# Patient Record
Sex: MALE | Race: WHITE | NOT HISPANIC OR LATINO | Employment: UNEMPLOYED | URBAN - METROPOLITAN AREA
[De-identification: names, ages, dates, MRNs, and addresses within clinical notes are randomized per-mention and may not be internally consistent; named-entity substitution may affect disease eponyms.]

---

## 2019-07-19 ENCOUNTER — HOSPITAL ENCOUNTER (EMERGENCY)
Facility: HOSPITAL | Age: 54
Discharge: HOME | End: 2019-07-19
Attending: EMERGENCY MEDICINE

## 2019-07-19 ENCOUNTER — APPOINTMENT (EMERGENCY)
Dept: RADIOLOGY | Facility: HOSPITAL | Age: 54
End: 2019-07-19

## 2019-07-19 VITALS
RESPIRATION RATE: 24 BRPM | WEIGHT: 315 LBS | TEMPERATURE: 98 F | HEART RATE: 93 BPM | SYSTOLIC BLOOD PRESSURE: 180 MMHG | DIASTOLIC BLOOD PRESSURE: 90 MMHG | OXYGEN SATURATION: 95 %

## 2019-07-19 DIAGNOSIS — M25.562 PATELLOFEMORAL ARTHRALGIA OF LEFT KNEE: Primary | ICD-10-CM

## 2019-07-19 PROCEDURE — 99283 EMERGENCY DEPT VISIT LOW MDM: CPT

## 2019-07-19 PROCEDURE — 73564 X-RAY EXAM KNEE 4 OR MORE: CPT

## 2019-07-19 RX ORDER — NAPROXEN 250 MG/1
250 TABLET ORAL ONCE
Status: COMPLETED | OUTPATIENT
Start: 2019-07-19 | End: 2019-07-19

## 2019-07-19 RX ORDER — NAPROXEN 250 MG/1
250 TABLET ORAL 2 TIMES DAILY WITH MEALS
Qty: 20 TABLET | Refills: 0 | Status: SHIPPED | OUTPATIENT
Start: 2019-07-19 | End: 2020-08-20 | Stop reason: HOSPADM

## 2019-07-19 RX ORDER — CYCLOBENZAPRINE HCL 10 MG
10 TABLET ORAL 2 TIMES DAILY PRN
Qty: 20 TABLET | Refills: 0 | Status: SHIPPED | OUTPATIENT
Start: 2019-07-19 | End: 2020-08-20 | Stop reason: HOSPADM

## 2019-07-19 RX ORDER — CYCLOBENZAPRINE HCL 10 MG
10 TABLET ORAL ONCE
Status: COMPLETED | OUTPATIENT
Start: 2019-07-19 | End: 2019-07-19

## 2019-07-19 RX ADMIN — CYCLOBENZAPRINE HYDROCHLORIDE 10 MG: 10 TABLET, FILM COATED ORAL at 15:58

## 2019-07-19 RX ADMIN — NAPROXEN 250 MG: 250 TABLET ORAL at 15:58

## 2019-07-19 NOTE — ED PROVIDER NOTES
History  Chief Complaint   Patient presents with    Leg Pain     L knee pain on and off for about a month  today fell when knee went down  now swelling and having shooting pains in it     Patient injured his left knee and twisting motion about a month ago  He did not seek medical attention at that time  Patient states he has been having increasing pain in the anterior aspect of the left knee primarily around the kneecap area  The pain is worse with movement, getting up from the seated position and climbing stairs  Today when the patient got up off the bed he felt sudden severe increase in pain in the left knee and caused him to fall down on a flexed left knee  Patient was able to get up and bear weight with pain, was able to attend to some chores today presents now with pain to the left knee  None       Past Medical History:   Diagnosis Date    Hypertension     Psychiatric disorder        Past Surgical History:   Procedure Laterality Date    VASECTOMY         History reviewed  No pertinent family history  I have reviewed and agree with the history as documented  Social History     Tobacco Use    Smoking status: Current Every Day Smoker     Packs/day: 1 00    Smokeless tobacco: Never Used   Substance Use Topics    Alcohol use: Not Currently    Drug use: Yes     Types: Marijuana        Review of Systems   Constitutional: Negative for chills and fever  HENT: Negative for congestion  Eyes: Negative for visual disturbance  Respiratory: Negative for shortness of breath  Cardiovascular: Positive for leg swelling  Negative for chest pain  Gastrointestinal: Negative for abdominal pain  Genitourinary: Negative for dysuria  Musculoskeletal: Positive for arthralgias, gait problem, joint swelling and neck pain  Skin: Negative for rash  Neurological: Positive for weakness  Negative for syncope and headaches  Hematological: Does not bruise/bleed easily     Psychiatric/Behavioral: Negative for confusion  All other systems reviewed and are negative  Physical Exam  Physical Exam   Constitutional: He is oriented to person, place, and time  Patient is obese   HENT:   Head: Normocephalic and atraumatic  Eyes: Conjunctivae are normal    Neck: Normal range of motion  Neck supple  Cardiovascular: Normal rate, regular rhythm, normal heart sounds and intact distal pulses  Pulmonary/Chest: Effort normal    Abdominal: Soft  Bowel sounds are normal  There is no tenderness  Musculoskeletal: He exhibits tenderness  He exhibits no deformity  Patient is tender directly over the patella  There is no pain or ligamentous laxity with medial lateral stress or drawer sign  Pt is able to lock the knee in extension  Neurological: He is alert and oriented to person, place, and time  Skin: Skin is warm and dry  No rash noted  He is not diaphoretic  No erythema  Nursing note and vitals reviewed  Vital Signs  ED Triage Vitals [07/19/19 1413]   Temperature Pulse Respirations Blood Pressure SpO2   98 °F (36 7 °C) 93 (!) 28 (!) 176/111 95 %      Temp Source Heart Rate Source Patient Position - Orthostatic VS BP Location FiO2 (%)   Tympanic Monitor Sitting Left arm --      Pain Score       5           Vitals:    07/19/19 1413   BP: (!) 176/111   Pulse: 93   Patient Position - Orthostatic VS: Sitting         Visual Acuity      ED Medications  Medications - No data to display    Diagnostic Studies  Results Reviewed     None                 XR knee 4+ vw left injury    (Results Pending)              Procedures  Procedures       ED Course                               MDM  Number of Diagnoses or Management Options  Diagnosis management comments: Will x-ray the knee for possible bony involvement    It I suspect there is some baseline level of DJD and likely patellofemoral syndrome      Disposition  Final diagnoses:   None     ED Disposition     None      Follow-up Information    None Patient's Medications    No medications on file     No discharge procedures on file      ED Provider  Electronically Signed by           Dayna Altamirano MD  07/19/19 9787

## 2020-08-19 ENCOUNTER — HOSPITAL ENCOUNTER (OUTPATIENT)
Facility: HOSPITAL | Age: 55
Setting detail: OBSERVATION
Discharge: HOME | End: 2020-08-20
Attending: EMERGENCY MEDICINE | Admitting: INTERNAL MEDICINE
Payer: COMMERCIAL

## 2020-08-19 ENCOUNTER — APPOINTMENT (EMERGENCY)
Dept: RADIOLOGY | Facility: HOSPITAL | Age: 55
End: 2020-08-19
Payer: COMMERCIAL

## 2020-08-19 DIAGNOSIS — R07.9 CHEST PAIN: Primary | ICD-10-CM

## 2020-08-19 DIAGNOSIS — E66.01 MORBID OBESITY (HCC): ICD-10-CM

## 2020-08-19 DIAGNOSIS — R94.31 ABNORMAL EKG: ICD-10-CM

## 2020-08-19 DIAGNOSIS — I10 HYPERTENSION: ICD-10-CM

## 2020-08-19 PROBLEM — D72.829 LEUKOCYTOSIS: Status: ACTIVE | Noted: 2020-08-19

## 2020-08-19 PROBLEM — R65.10 SIRS (SYSTEMIC INFLAMMATORY RESPONSE SYNDROME) (HCC): Status: ACTIVE | Noted: 2020-08-19

## 2020-08-19 LAB
ALBUMIN SERPL BCP-MCNC: 3.3 G/DL (ref 3.5–5)
ALP SERPL-CCNC: 103 U/L (ref 46–116)
ALT SERPL W P-5'-P-CCNC: 22 U/L (ref 12–78)
ANION GAP SERPL CALCULATED.3IONS-SCNC: 7 MMOL/L (ref 4–13)
APTT PPP: 29 SECONDS (ref 23–37)
AST SERPL W P-5'-P-CCNC: 12 U/L (ref 5–45)
BASOPHILS # BLD AUTO: 0.03 THOUSANDS/ΜL (ref 0–0.1)
BASOPHILS NFR BLD AUTO: 0 % (ref 0–1)
BILIRUB SERPL-MCNC: 0.3 MG/DL (ref 0.2–1)
BUN SERPL-MCNC: 18 MG/DL (ref 5–25)
CALCIUM SERPL-MCNC: 9.2 MG/DL (ref 8.3–10.1)
CHLORIDE SERPL-SCNC: 100 MMOL/L (ref 100–108)
CO2 SERPL-SCNC: 31 MMOL/L (ref 21–32)
CREAT SERPL-MCNC: 0.88 MG/DL (ref 0.6–1.3)
EOSINOPHIL # BLD AUTO: 0.14 THOUSAND/ΜL (ref 0–0.61)
EOSINOPHIL NFR BLD AUTO: 1 % (ref 0–6)
ERYTHROCYTE [DISTWIDTH] IN BLOOD BY AUTOMATED COUNT: 13.6 % (ref 11.6–15.1)
EST. AVERAGE GLUCOSE BLD GHB EST-MCNC: 114 MG/DL
GFR SERPL CREATININE-BSD FRML MDRD: 97 ML/MIN/1.73SQ M
GLUCOSE SERPL-MCNC: 88 MG/DL (ref 65–140)
HBA1C MFR BLD: 5.6 %
HCT VFR BLD AUTO: 42.2 % (ref 36.5–49.3)
HGB BLD-MCNC: 13.7 G/DL (ref 12–17)
IMM GRANULOCYTES # BLD AUTO: 0.04 THOUSAND/UL (ref 0–0.2)
IMM GRANULOCYTES NFR BLD AUTO: 0 % (ref 0–2)
INR PPP: 1.01 (ref 0.84–1.19)
LYMPHOCYTES # BLD AUTO: 2.17 THOUSANDS/ΜL (ref 0.6–4.47)
LYMPHOCYTES NFR BLD AUTO: 18 % (ref 14–44)
MCH RBC QN AUTO: 29.5 PG (ref 26.8–34.3)
MCHC RBC AUTO-ENTMCNC: 32.5 G/DL (ref 31.4–37.4)
MCV RBC AUTO: 91 FL (ref 82–98)
MONOCYTES # BLD AUTO: 1.34 THOUSAND/ΜL (ref 0.17–1.22)
MONOCYTES NFR BLD AUTO: 11 % (ref 4–12)
NEUTROPHILS # BLD AUTO: 8.12 THOUSANDS/ΜL (ref 1.85–7.62)
NEUTS SEG NFR BLD AUTO: 70 % (ref 43–75)
NRBC BLD AUTO-RTO: 0 /100 WBCS
NT-PROBNP SERPL-MCNC: 95 PG/ML
PLATELET # BLD AUTO: 248 THOUSANDS/UL (ref 149–390)
PMV BLD AUTO: 9.3 FL (ref 8.9–12.7)
POTASSIUM SERPL-SCNC: 3.9 MMOL/L (ref 3.5–5.3)
PROT SERPL-MCNC: 8 G/DL (ref 6.4–8.2)
PROTHROMBIN TIME: 13.2 SECONDS (ref 11.6–14.5)
RBC # BLD AUTO: 4.65 MILLION/UL (ref 3.88–5.62)
SARS-COV-2 RNA RESP QL NAA+PROBE: NEGATIVE
SODIUM SERPL-SCNC: 138 MMOL/L (ref 136–145)
TROPONIN I SERPL-MCNC: <0.02 NG/ML
TROPONIN I SERPL-MCNC: <0.02 NG/ML
WBC # BLD AUTO: 11.84 THOUSAND/UL (ref 4.31–10.16)

## 2020-08-19 PROCEDURE — 84484 ASSAY OF TROPONIN QUANT: CPT | Performed by: PHYSICIAN ASSISTANT

## 2020-08-19 PROCEDURE — 80053 COMPREHEN METABOLIC PANEL: CPT | Performed by: EMERGENCY MEDICINE

## 2020-08-19 PROCEDURE — 71045 X-RAY EXAM CHEST 1 VIEW: CPT

## 2020-08-19 PROCEDURE — 99285 EMERGENCY DEPT VISIT HI MDM: CPT

## 2020-08-19 PROCEDURE — 36415 COLL VENOUS BLD VENIPUNCTURE: CPT | Performed by: EMERGENCY MEDICINE

## 2020-08-19 PROCEDURE — 85610 PROTHROMBIN TIME: CPT | Performed by: EMERGENCY MEDICINE

## 2020-08-19 PROCEDURE — 83036 HEMOGLOBIN GLYCOSYLATED A1C: CPT | Performed by: EMERGENCY MEDICINE

## 2020-08-19 PROCEDURE — 94664 DEMO&/EVAL PT USE INHALER: CPT

## 2020-08-19 PROCEDURE — 83880 ASSAY OF NATRIURETIC PEPTIDE: CPT | Performed by: EMERGENCY MEDICINE

## 2020-08-19 PROCEDURE — 84484 ASSAY OF TROPONIN QUANT: CPT | Performed by: EMERGENCY MEDICINE

## 2020-08-19 PROCEDURE — 99219 PR INITIAL OBSERVATION CARE/DAY 50 MINUTES: CPT | Performed by: PHYSICIAN ASSISTANT

## 2020-08-19 PROCEDURE — 93005 ELECTROCARDIOGRAM TRACING: CPT

## 2020-08-19 PROCEDURE — 85025 COMPLETE CBC W/AUTO DIFF WBC: CPT | Performed by: EMERGENCY MEDICINE

## 2020-08-19 PROCEDURE — 85730 THROMBOPLASTIN TIME PARTIAL: CPT | Performed by: EMERGENCY MEDICINE

## 2020-08-19 PROCEDURE — 94760 N-INVAS EAR/PLS OXIMETRY 1: CPT

## 2020-08-19 PROCEDURE — 94640 AIRWAY INHALATION TREATMENT: CPT

## 2020-08-19 PROCEDURE — 87635 SARS-COV-2 COVID-19 AMP PRB: CPT | Performed by: EMERGENCY MEDICINE

## 2020-08-19 PROCEDURE — 99285 EMERGENCY DEPT VISIT HI MDM: CPT | Performed by: EMERGENCY MEDICINE

## 2020-08-19 RX ORDER — IPRATROPIUM BROMIDE AND ALBUTEROL SULFATE 2.5; .5 MG/3ML; MG/3ML
3 SOLUTION RESPIRATORY (INHALATION)
Status: DISCONTINUED | OUTPATIENT
Start: 2020-08-19 | End: 2020-08-20 | Stop reason: HOSPADM

## 2020-08-19 RX ORDER — ASPIRIN 81 MG/1
324 TABLET, CHEWABLE ORAL ONCE
Status: COMPLETED | OUTPATIENT
Start: 2020-08-19 | End: 2020-08-19

## 2020-08-19 RX ORDER — ACETAMINOPHEN 325 MG/1
650 TABLET ORAL ONCE
Status: COMPLETED | OUTPATIENT
Start: 2020-08-19 | End: 2020-08-19

## 2020-08-19 RX ORDER — CALCIUM CARBONATE 200(500)MG
1000 TABLET,CHEWABLE ORAL DAILY PRN
Status: DISCONTINUED | OUTPATIENT
Start: 2020-08-19 | End: 2020-08-20 | Stop reason: HOSPADM

## 2020-08-19 RX ORDER — NITROGLYCERIN 0.4 MG/1
0.4 TABLET SUBLINGUAL
Status: DISCONTINUED | OUTPATIENT
Start: 2020-08-19 | End: 2020-08-20 | Stop reason: HOSPADM

## 2020-08-19 RX ORDER — NITROGLYCERIN 0.4 MG/1
0.4 TABLET SUBLINGUAL ONCE
Status: COMPLETED | OUTPATIENT
Start: 2020-08-19 | End: 2020-08-19

## 2020-08-19 RX ORDER — METOPROLOL TARTRATE 5 MG/5ML
2.5 INJECTION INTRAVENOUS EVERY 6 HOURS PRN
Status: DISCONTINUED | OUTPATIENT
Start: 2020-08-19 | End: 2020-08-20 | Stop reason: HOSPADM

## 2020-08-19 RX ORDER — ACETAMINOPHEN 325 MG/1
650 TABLET ORAL EVERY 4 HOURS PRN
Status: DISCONTINUED | OUTPATIENT
Start: 2020-08-19 | End: 2020-08-20 | Stop reason: HOSPADM

## 2020-08-19 RX ADMIN — IPRATROPIUM BROMIDE AND ALBUTEROL SULFATE 3 ML: 2.5; .5 SOLUTION RESPIRATORY (INHALATION) at 21:27

## 2020-08-19 RX ADMIN — ACETAMINOPHEN 650 MG: 325 TABLET, FILM COATED ORAL at 18:34

## 2020-08-19 RX ADMIN — ASPIRIN 81 MG 324 MG: 81 TABLET ORAL at 17:04

## 2020-08-19 RX ADMIN — NITROGLYCERIN 0.4 MG: 0.4 TABLET SUBLINGUAL at 17:06

## 2020-08-19 RX ADMIN — METOPROLOL TARTRATE 25 MG: 25 TABLET, FILM COATED ORAL at 22:49

## 2020-08-19 NOTE — ED PROVIDER NOTES
History  Chief Complaint   Patient presents with    Chest Pain     c/o pain in chest with inspiration simnce Aprilwith a cough since Feb    Cough     48 yo male c/o chest pain off and on x several months  Pain is a sharp pressure, worse with activity and deep breath   + associated sob, sweats  No nausea, vomiting, diarrhea   + cough x months as well - pt  Is a smoker  Saw a doctor in [place] 4 days ago and put on a water pill for swelling in his legs  No prior diagnoses of HTN, high chol , DM  Chest Pain   Associated symptoms: cough and shortness of breath    Associated symptoms: no abdominal pain, no back pain, no dizziness, no fever, no headache, no nausea and not vomiting    Cough   Associated symptoms: chest pain and shortness of breath    Associated symptoms: no chills, no fever, no headaches, no myalgias, no rash and no sore throat        Prior to Admission Medications   Prescriptions Last Dose Informant Patient Reported? Taking? cyclobenzaprine (FLEXERIL) 10 mg tablet Not Taking at Unknown time  No No   Sig: Take 1 tablet (10 mg total) by mouth 2 (two) times a day as needed for muscle spasms   Patient not taking: Reported on 8/19/2020   naproxen (NAPROSYN) 250 mg tablet Not Taking at Unknown time  No No   Sig: Take 1 tablet (250 mg total) by mouth 2 (two) times a day with meals   Patient not taking: Reported on 8/19/2020      Facility-Administered Medications: None       Past Medical History:   Diagnosis Date    Hypertension     Psychiatric disorder        Past Surgical History:   Procedure Laterality Date    ABDOMINAL SURGERY      SPLENECTOMY, TOTAL      VASECTOMY         No family history on file  I have reviewed and agree with the history as documented      E-Cigarette/Vaping     E-Cigarette/Vaping Substances     Social History     Tobacco Use    Smoking status: Current Every Day Smoker     Packs/day: 1 00    Smokeless tobacco: Never Used   Substance Use Topics    Alcohol use: Not Currently    Drug use: Yes     Types: Marijuana       Review of Systems   Constitutional: Negative  Negative for chills and fever  HENT: Negative  Negative for congestion and sore throat  Eyes: Negative  Respiratory: Positive for cough and shortness of breath  Cardiovascular: Positive for chest pain and leg swelling  Gastrointestinal: Negative  Negative for abdominal pain, diarrhea, nausea and vomiting  Genitourinary: Negative  Negative for dysuria, flank pain and hematuria  Musculoskeletal: Negative  Negative for back pain and myalgias  Skin: Negative  Negative for rash and wound  Neurological: Negative  Negative for dizziness and headaches  Psychiatric/Behavioral: Negative  Negative for confusion and hallucinations  The patient is not nervous/anxious  All other systems reviewed and are negative  Physical Exam  Physical Exam  Vitals signs and nursing note reviewed  Constitutional:       General: He is not in acute distress  Appearance: He is well-developed  He is obese  He is not ill-appearing, toxic-appearing or diaphoretic  HENT:      Head: Normocephalic and atraumatic  Eyes:      General: No scleral icterus  Conjunctiva/sclera: Conjunctivae normal    Neck:      Musculoskeletal: Normal range of motion and neck supple  Cardiovascular:      Rate and Rhythm: Regular rhythm  Tachycardia present  Heart sounds: Murmur present  Pulmonary:      Effort: Pulmonary effort is normal  No respiratory distress  Breath sounds: Normal breath sounds  Comments: Pt  Coughing periodically  Chest:      Chest wall: No tenderness  Abdominal:      General: Bowel sounds are normal  There is no distension  Palpations: Abdomen is soft  Tenderness: There is no abdominal tenderness  Musculoskeletal: Normal range of motion  General: No tenderness or deformity  Right lower leg: Edema present  Left lower leg: Edema present     Skin: General: Skin is warm and dry  Coloration: Skin is not pale  Findings: No erythema or rash  Comments: Chronic stasis changes bilat  Lower ext   Neurological:      Mental Status: He is alert and oriented to person, place, and time  Cranial Nerves: No cranial nerve deficit  Psychiatric:         Mood and Affect: Mood normal          Behavior: Behavior normal          Vital Signs  ED Triage Vitals [08/19/20 1640]   Temperature Pulse Respirations Blood Pressure SpO2   98 2 °F (36 8 °C) 104 20 (!) 187/117 94 %      Temp Source Heart Rate Source Patient Position - Orthostatic VS BP Location FiO2 (%)   Tympanic Monitor Sitting Right arm --      Pain Score       5           Vitals:    08/19/20 1640 08/19/20 1815   BP: (!) 187/117 138/69   Pulse: 104 90   Patient Position - Orthostatic VS: Sitting          Visual Acuity      ED Medications  Medications   nitroglycerin (NITRO-BID) 2 % TD ointment 1 inch (1 inch Topical Not Given 8/19/20 1819)   ipratropium-albuterol (DUO-NEB) 0 5-2 5 mg/3 mL inhalation solution 3 mL (has no administration in time range)   acetaminophen (TYLENOL) tablet 650 mg (has no administration in time range)   nitroglycerin (NITROSTAT) SL tablet 0 4 mg (0 4 mg Sublingual Given 8/19/20 1706)   aspirin chewable tablet 324 mg (324 mg Oral Given 8/19/20 1704)       Diagnostic Studies  Results Reviewed     Procedure Component Value Units Date/Time    Novel Coronavirus [facility] [[id number]]  (Normal) Collected:  08/19/20 1702    Lab Status:  Final result Specimen:  Nares from Nose Updated:  08/19/20 1801     SARS-CoV-2 Negative    Narrative: The specimen collection materials, transport medium, and/or testing methodology utilized in the production of these test results have been proven to be reliable in a limited validation with an abbreviated program under the Emergency Utilization Authorization provided by the FDA    Testing reported as "Presumptive positive" will be confirmed with secondary testing with a reference laboratory to ensure result accuracy  Clinical caution and judgement should be used with the interpretation of these results with consideration of the clinical impression and other laboratory testing  Testing reported as "Positive" or "Negative" has been proven to be accurate according to standard laboratory validation requirements  All testing is performed with control materials showing appropriate reactivity at standard intervals        NT-BNP PRO [[id number]]  (Normal) Collected:  08/19/20 1702    Lab Status:  Final result Specimen:  Blood from Arm, Left Updated:  08/19/20 1732     NT-proBNP 95 pg/mL     Troponin I [[id number]]  (Normal) Collected:  08/19/20 1702    Lab Status:  Final result Specimen:  Blood from Arm, Left Updated:  08/19/20 1730     Troponin I <0 02 ng/mL     Comprehensive metabolic panel [[id number]]  (Abnormal) Collected:  08/19/20 1702    Lab Status:  Final result Specimen:  Blood from Arm, Left Updated:  08/19/20 1726     Sodium 138 mmol/L      Potassium 3 9 mmol/L      Chloride 100 mmol/L      CO2 31 mmol/L      ANION GAP 7 mmol/L      BUN 18 mg/dL      Creatinine 0 88 mg/dL      Glucose 88 mg/dL      Calcium 9 2 mg/dL      AST 12 U/L      ALT 22 U/L      Alkaline Phosphatase 103 U/L      Total Protein 8 0 g/dL      Albumin 3 3 g/dL      Total Bilirubin 0 30 mg/dL      eGFR 97 ml/min/1 73sq m     Narrative:       [place] guidelines for Chronic Kidney Disease (CKD):     Stage 1 with normal or high GFR (GFR > 90 mL/min/1 73 square meters)    Stage 2 Mild CKD (GFR = 60-89 mL/min/1 73 square meters)    Stage 3A Moderate CKD (GFR = 45-59 mL/min/1 73 square meters)    Stage 3B Moderate CKD (GFR = 30-44 mL/min/1 73 square meters)    Stage 4 Severe CKD (GFR = 15-29 mL/min/1 73 square meters)    Stage 5 End Stage CKD (GFR <15 mL/min/1 73 square meters)  Note: GFR calculation is accurate only with a steady state creatinine    Protime-INR [[id number]]  (Normal) Collected:  08/19/20 1702    Lab Status:  Final result Specimen:  Blood from Arm, Left Updated:  08/19/20 1722     Protime 13 2 seconds      INR 1 01    APTT [[id number]]  (Normal) Collected:  08/19/20 1702    Lab Status:  Final result Specimen:  Blood from Arm, Left Updated:  08/19/20 1722     PTT 29 seconds     CBC and differential [[id number]]  (Abnormal) Collected:  08/19/20 1702    Lab Status:  Final result Specimen:  Blood from Arm, Left Updated:  08/19/20 1709     WBC 11 84 Thousand/uL      RBC 4 65 Million/uL      Hemoglobin 13 7 g/dL      Hematocrit 42 2 %      MCV 91 fL      MCH 29 5 pg      MCHC 32 5 g/dL      RDW 13 6 %      MPV 9 3 fL      Platelets 363 Thousands/uL      nRBC 0 /100 WBCs      Neutrophils Relative 70 %      Immat GRANS % 0 %      Lymphocytes Relative 18 %      Monocytes Relative 11 %      Eosinophils Relative 1 %      Basophils Relative 0 %      Neutrophils Absolute 8 12 Thousands/µL      Immature Grans Absolute 0 04 Thousand/uL      Lymphocytes Absolute 2 17 Thousands/µL      Monocytes Absolute 1 34 Thousand/µL      Eosinophils Absolute 0 14 Thousand/µL      Basophils Absolute 0 03 Thousands/µL     Hemoglobin A1C [[id number]] Collected:  08/19/20 1702    Lab Status:   In process Specimen:  Blood from Arm, Left Updated:  08/19/20 1707    UA (URINE) with reflex to Scope [[id number]]     Lab Status:  No result Specimen:  Urine                  XR chest 1 view portable   ED Interpretation by Chris Sanders MD (12/29 [id number])   CM                 Procedures  ECG 12 Lead Documentation Only    Date/Time: 8/19/2020 4:41 PM  Performed by: Chris Sanders MD  Authorized by: Chris Sanders MD     Indications / Diagnosis:  Chest pain  ECG reviewed by me, the ED Provider: yes    Patient location:  ED  Previous ECG:     Previous ECG:  Unavailable  Interpretation:     Interpretation: abnormal    Rate:     ECG rate:  101    ECG rate assessment: tachycardic Rhythm:     Rhythm: sinus tachycardia    Ectopy:     Ectopy: PAC    QRS:     QRS axis:  Normal  Conduction:     Conduction: normal    ST segments:     ST segments:  Normal  T waves:     T waves: inverted      Inverted:  I, aVL, V5 and V6             ED Course       US AUDIT      Most Recent Value   Initial Alcohol Screen: US AUDIT-C    1  How often do you have a drink containing alcohol?  0 Filed at: 08/19/2020 1641   3a  Male UNDER 65: How often do you have five or more drinks on one occasion? 0 Filed at: 08/19/2020 1641   Audit-C Score  0 Filed at: 08/19/2020 1641            HEART Risk Score      Most Recent Value   Heart Score Risk Calculator   History  2 Filed at: 08/19/2020 1813   ECG  1 Filed at: 08/19/2020 1813   Age  1 Filed at: 08/19/2020 1813   Risk Factors  1 Filed at: 08/19/2020 1813   Troponin  0 Filed at: 08/19/2020 1813   HEART Score  5 Filed at: 08/19/2020 1813            PRASHANT/DAST-10      Most Recent Value   How many times in the past year have you    Used an illegal drug or used a prescription medication for non-medical reasons? Never Filed at: 08/19/2020 1641                                MDM  Number of Diagnoses or Management Options  Abnormal EKG:   Chest pain:   Hypertension:   Morbid obesity [facility]):   Diagnosis management comments: 1805 - /73, HR 91, pulse ox 94% on RA  Pt  Denies chest pain at this time  covid negative  Will try neb for the cough/sob  Will admit for r/o ACS  Pt with concerning symptoms and abnormal ekg and multiple risk factors, heart score 5    Discussed with hospitalist         Disposition  Final diagnoses:   Chest pain   Morbid obesity ([address] )   Hypertension   Abnormal EKG     Time reflects when diagnosis was documented in both MDM as applicable and the Disposition within this note     Time User Action Codes Description Comment    3/27/4831  2:32 PM Obdulia Jangs A Add [U05 4] Chest pain     4/69/7822  5:48 PM Jackqulyn Forts A Add [W67 50] Morbid obesity ([facility] [address] )     6/01/0555  9:69 PM Lora BURGER Add [L44] Hypertension     8/11/6255  7:53 PM Lora BURGER Add [A96 25] Abnormal EKG       ED Disposition     ED Disposition Condition Date/Time Comment    Admit Stable Wed Aug 19, 2020  6:30 PM Case was discussed with **Dr Sriram Covarrubias* and the patient's admission status was agreed to be Admission Status: observation status      Follow-up Information    None         Patient's Medications   Discharge Prescriptions    No medications on file     No discharge procedures on file      PDMP Review     None          ED Provider  Electronically Signed by           Rand Pereyra MD  30/77/56 6759

## 2020-08-20 ENCOUNTER — APPOINTMENT (OUTPATIENT)
Dept: RADIOLOGY | Facility: HOSPITAL | Age: 55
End: 2020-08-20
Payer: COMMERCIAL

## 2020-08-20 ENCOUNTER — APPOINTMENT (OUTPATIENT)
Dept: NON INVASIVE DIAGNOSTICS | Facility: HOSPITAL | Age: 55
End: 2020-08-20
Payer: COMMERCIAL

## 2020-08-20 VITALS
HEART RATE: 86 BPM | WEIGHT: 315 LBS | RESPIRATION RATE: 19 BRPM | DIASTOLIC BLOOD PRESSURE: 88 MMHG | TEMPERATURE: 98.2 F | HEIGHT: 72 IN | OXYGEN SATURATION: 94 % | BODY MASS INDEX: 42.66 KG/M2 | SYSTOLIC BLOOD PRESSURE: 146 MMHG

## 2020-08-20 LAB
ANION GAP SERPL CALCULATED.3IONS-SCNC: 5 MMOL/L (ref 4–13)
BACTERIA UR QL AUTO: ABNORMAL /HPF
BASOPHILS # BLD AUTO: 0.03 THOUSANDS/ΜL (ref 0–0.1)
BASOPHILS NFR BLD AUTO: 0 % (ref 0–1)
BILIRUB UR QL STRIP: NEGATIVE
BUN SERPL-MCNC: 15 MG/DL (ref 5–25)
CALCIUM SERPL-MCNC: 8.5 MG/DL (ref 8.3–10.1)
CHEST PAIN STATEMENT: NORMAL
CHLORIDE SERPL-SCNC: 100 MMOL/L (ref 100–108)
CHOLEST SERPL-MCNC: 135 MG/DL (ref 50–200)
CLARITY UR: CLEAR
CO2 SERPL-SCNC: 31 MMOL/L (ref 21–32)
COLOR UR: YELLOW
CREAT SERPL-MCNC: 0.91 MG/DL (ref 0.6–1.3)
EOSINOPHIL # BLD AUTO: 0.19 THOUSAND/ΜL (ref 0–0.61)
EOSINOPHIL NFR BLD AUTO: 2 % (ref 0–6)
ERYTHROCYTE [DISTWIDTH] IN BLOOD BY AUTOMATED COUNT: 13.8 % (ref 11.6–15.1)
GFR SERPL CREATININE-BSD FRML MDRD: 95 ML/MIN/1.73SQ M
GLUCOSE P FAST SERPL-MCNC: 104 MG/DL (ref 65–99)
GLUCOSE SERPL-MCNC: 104 MG/DL (ref 65–140)
GLUCOSE UR STRIP-MCNC: NEGATIVE MG/DL
HCT VFR BLD AUTO: 45.5 % (ref 36.5–49.3)
HDLC SERPL-MCNC: 36 MG/DL
HGB BLD-MCNC: 14.5 G/DL (ref 12–17)
HGB UR QL STRIP.AUTO: NEGATIVE
IMM GRANULOCYTES # BLD AUTO: 0.06 THOUSAND/UL (ref 0–0.2)
IMM GRANULOCYTES NFR BLD AUTO: 1 % (ref 0–2)
KETONES UR STRIP-MCNC: NEGATIVE MG/DL
LDLC SERPL CALC-MCNC: 80 MG/DL (ref 0–100)
LEUKOCYTE ESTERASE UR QL STRIP: NEGATIVE
LYMPHOCYTES # BLD AUTO: 2.45 THOUSANDS/ΜL (ref 0.6–4.47)
LYMPHOCYTES NFR BLD AUTO: 27 % (ref 14–44)
MAX DIASTOLIC BP: 83 MMHG
MAX HEART RATE: 110 BPM
MAX PREDICTED HEART RATE: 166 BPM
MAX. SYSTOLIC BP: 168 MMHG
MCH RBC QN AUTO: 29 PG (ref 26.8–34.3)
MCHC RBC AUTO-ENTMCNC: 31.9 G/DL (ref 31.4–37.4)
MCV RBC AUTO: 91 FL (ref 82–98)
MONOCYTES # BLD AUTO: 0.86 THOUSAND/ΜL (ref 0.17–1.22)
MONOCYTES NFR BLD AUTO: 9 % (ref 4–12)
NEUTROPHILS # BLD AUTO: 5.65 THOUSANDS/ΜL (ref 1.85–7.62)
NEUTS SEG NFR BLD AUTO: 61 % (ref 43–75)
NITRITE UR QL STRIP: NEGATIVE
NON-SQ EPI CELLS URNS QL MICRO: ABNORMAL /HPF
NRBC BLD AUTO-RTO: 0 /100 WBCS
PH UR STRIP.AUTO: 5.5 [PH]
PLATELET # BLD AUTO: 250 THOUSANDS/UL (ref 149–390)
PMV BLD AUTO: 9.7 FL (ref 8.9–12.7)
POTASSIUM SERPL-SCNC: 3.9 MMOL/L (ref 3.5–5.3)
PROCALCITONIN SERPL-MCNC: <0.05 NG/ML
PROT UR STRIP-MCNC: ABNORMAL MG/DL
PROTOCOL NAME: NORMAL
RBC # BLD AUTO: 5 MILLION/UL (ref 3.88–5.62)
RBC #/AREA URNS AUTO: ABNORMAL /HPF
REASON FOR TERMINATION: NORMAL
SODIUM SERPL-SCNC: 136 MMOL/L (ref 136–145)
SP GR UR STRIP.AUTO: 1.02 (ref 1–1.03)
TARGET HR FORMULA: NORMAL
TEST INDICATION: NORMAL
TIME IN EXERCISE PHASE: NORMAL
TRIGL SERPL-MCNC: 93 MG/DL
TROPONIN I SERPL-MCNC: <0.02 NG/ML
UROBILINOGEN UR QL STRIP.AUTO: 0.2 E.U./DL
WBC # BLD AUTO: 9.24 THOUSAND/UL (ref 4.31–10.16)
WBC #/AREA URNS AUTO: ABNORMAL /HPF

## 2020-08-20 PROCEDURE — 81001 URINALYSIS AUTO W/SCOPE: CPT | Performed by: PHYSICIAN ASSISTANT

## 2020-08-20 PROCEDURE — 93018 CV STRESS TEST I&R ONLY: CPT | Performed by: INTERNAL MEDICINE

## 2020-08-20 PROCEDURE — 84484 ASSAY OF TROPONIN QUANT: CPT | Performed by: PHYSICIAN ASSISTANT

## 2020-08-20 PROCEDURE — 84145 PROCALCITONIN (PCT): CPT | Performed by: PHYSICIAN ASSISTANT

## 2020-08-20 PROCEDURE — 93017 CV STRESS TEST TRACING ONLY: CPT

## 2020-08-20 PROCEDURE — 97163 PT EVAL HIGH COMPLEX 45 MIN: CPT

## 2020-08-20 PROCEDURE — A9502 TC99M TETROFOSMIN: HCPCS

## 2020-08-20 PROCEDURE — 78452 HT MUSCLE IMAGE SPECT MULT: CPT

## 2020-08-20 PROCEDURE — 80061 LIPID PANEL: CPT | Performed by: PHYSICIAN ASSISTANT

## 2020-08-20 PROCEDURE — 97116 GAIT TRAINING THERAPY: CPT

## 2020-08-20 PROCEDURE — G1004 CDSM NDSC: HCPCS

## 2020-08-20 PROCEDURE — 93306 TTE W/DOPPLER COMPLETE: CPT

## 2020-08-20 PROCEDURE — 93306 TTE W/DOPPLER COMPLETE: CPT | Performed by: INTERNAL MEDICINE

## 2020-08-20 PROCEDURE — 93016 CV STRESS TEST SUPVJ ONLY: CPT | Performed by: INTERNAL MEDICINE

## 2020-08-20 PROCEDURE — 93005 ELECTROCARDIOGRAM TRACING: CPT

## 2020-08-20 PROCEDURE — 85025 COMPLETE CBC W/AUTO DIFF WBC: CPT | Performed by: PHYSICIAN ASSISTANT

## 2020-08-20 PROCEDURE — 80048 BASIC METABOLIC PNL TOTAL CA: CPT | Performed by: PHYSICIAN ASSISTANT

## 2020-08-20 PROCEDURE — 78452 HT MUSCLE IMAGE SPECT MULT: CPT | Performed by: INTERNAL MEDICINE

## 2020-08-20 PROCEDURE — 99217 PR OBSERVATION CARE DISCHARGE MANAGEMENT: CPT | Performed by: INTERNAL MEDICINE

## 2020-08-20 PROCEDURE — 94640 AIRWAY INHALATION TREATMENT: CPT

## 2020-08-20 PROCEDURE — 99244 OFF/OP CNSLTJ NEW/EST MOD 40: CPT | Performed by: INTERNAL MEDICINE

## 2020-08-20 RX ORDER — LISINOPRIL AND HYDROCHLOROTHIAZIDE 25; 20 MG/1; MG/1
1 TABLET ORAL DAILY
Qty: 30 TABLET | Refills: 0 | Status: SHIPPED | OUTPATIENT
Start: 2020-08-20 | End: 2020-12-07 | Stop reason: SDUPTHER

## 2020-08-20 RX ADMIN — ENOXAPARIN SODIUM 40 MG: 40 INJECTION SUBCUTANEOUS at 09:34

## 2020-08-20 RX ADMIN — IPRATROPIUM BROMIDE AND ALBUTEROL SULFATE 3 ML: 2.5; .5 SOLUTION RESPIRATORY (INHALATION) at 07:22

## 2020-08-20 RX ADMIN — PERFLUTREN 2 ML/MIN: 6.52 INJECTION, SUSPENSION INTRAVENOUS at 08:40

## 2020-08-20 RX ADMIN — IPRATROPIUM BROMIDE AND ALBUTEROL SULFATE 3 ML: 2.5; .5 SOLUTION RESPIRATORY (INHALATION) at 01:25

## 2020-08-20 RX ADMIN — REGADENOSON 0.4 MG: 0.08 INJECTION, SOLUTION INTRAVENOUS at 11:23

## 2020-08-20 RX ADMIN — IPRATROPIUM BROMIDE AND ALBUTEROL SULFATE 3 ML: 2.5; .5 SOLUTION RESPIRATORY (INHALATION) at 13:38

## 2020-08-20 NOTE — DISCHARGE SUMMARY
Discharge- Quincy Jiménez 1965, 47 y o  male MRN: [id number]    Unit/Bed#: [address] [place] [address] Encounter: [id number]    Primary Care Provider: No primary care provider on file  Date and time admitted to hospital: 8/19/2020  4:33 PM        * Chest pain  Assessment & Plan  Pt presented to the ED today for evaluation of severe intermittent chest pain associated with SOB, diaphoresis, cough which has been intermittent for the past 3 weeks  He states he has had JACINTO since the beginning of the year  His chest pain is described as a sharp/stabbing sensation, and is sometimes worse with movement, other times the pain comes on unprovoked by movement or activity  He also describes worsening LE edema in the past year  He has not been to PCP in many years  Recently was seen at a clinic in [place] and prescribed a medication for his lower extremity edema but is not longer taking this  - EKG showed sinus tachycardia with T wave inversion in I, aVL, V5, V6 with no prior EKG available for comparison  Serial troponins were negative  - CXR with mild cardiomegaly  - Patient does not want any pain medications other than Tylenol as he states he has a history of ETOH/drug abuse   Telemetry did not show any evidence of arrhythmias  - BP improved with SL nitro  Patient received nitro and was started on Lopressor with improved blood pressure  Patient to be discharged on lisinopril/hydrochlorothiazide 20/25 milligram p o  Daily    SIRS (systemic inflammatory response syndrome) (HCC)  Assessment & Plan  WBC 11 84 and tachycardia on admission  CXR with mild cardiomegaly  COVID negative  Pt has chronic cough with minimal sputum production  UA was unremarkable  Count has normalized and patient did not have any fever  Hypertension  Assessment & Plan  BP elevated in ED, noted to be 187/117  BP improved after nitro  Patient was initially started on Lopressor 25 milligram p o  B i d   With improved blood pressure  Patient to be discharged on lisinopril/hydrochlorothiazide 20/25 milligram p o  Daily      Discharging Physician / Practitioner: Martín Kellogg MD  PCP: No primary care provider on file  Admission Date:   Admission Orders (From admission, onward)     Ordered        08/19/20 1831  Place in Observation (expected length of stay for this patient is less than two midnights)  Once                   Discharge Date: 08/20/20    Resolved Problems  Date Reviewed: 8/20/2020    None          Consultations During Hospital Stay:  · Cardiology    Procedures Performed:   · Nuclear stress test was normal  · Echo showed EF of 50 for 60% with grade 1 diastolic dysfunction with moderate aortic stenosis       Outpatient Tests Requested:  · Follow-up with PCP and Cardiology    Complications:  None    Reason for Admission:  Chest pain    Hospital Course:     Myles Kent is a 47 y o  male patient with past medical history of recently diagnosed hypertension, depression who originally presented to the hospital on 8/19/2020 due to chest pain  Patient has been complaining of chest pain intermittently for the past 2 weeks sometimes on the left side and sometimes on the rest sometimes in epigastric region  Patient present to the ED and was admitted hospital to rule out ACS  Patient had serial cardiac enzymes which were negative  Patient had significantly elevated blood pressure and initially was started on Lopressor which was later changed to lisinopril/hydrochlorothiazide  Patient was also seen by cardiology and patient underwent nuclear stress test and echo which were normal other than diastolic dysfunction and moderate aortic stenosis  Patient remained stable and will be discharged home to follow up outpatient with PCP and Cardiology  Please see above list of diagnoses and related plan for additional information       Condition at Discharge: stable     Discharge Day Visit / Exam:     Subjective:  Patient had no further chest pain since admission or shortness of breath  Vitals: Blood Pressure: 146/88 (08/20/20 0932)  Pulse: 86 (08/20/20 0925)  Temperature: 98 2 °F (36 8 °C) (08/20/20 0925)  Temp Source: Oral (08/20/20 0925)  Respirations: 19 (08/20/20 0925)  Height: 6' (182 9 cm) (08/19/20 2015)  Weight - Scale: (!) 195 kg (429 lb) (08/19/20 1640)  SpO2: 94 % (08/20/20 0925)  Exam:   Physical Exam  Constitutional:       Appearance: Normal appearance  HENT:      Head: Normocephalic and atraumatic  Eyes:      Extraocular Movements: Extraocular movements intact  Pupils: Pupils are equal, round, and reactive to light  Neck:      Musculoskeletal: Normal range of motion and neck supple  Cardiovascular:      Rate and Rhythm: Normal rate and regular rhythm  Heart sounds: Murmur present  No gallop  Comments: Ejection systolic murmur  Pulmonary:      Effort: Pulmonary effort is normal       Breath sounds: Normal breath sounds  Abdominal:      General: Bowel sounds are normal       Palpations: Abdomen is soft  Tenderness: There is no abdominal tenderness  Musculoskeletal: Normal range of motion  General: No swelling or deformity  Skin:     General: Skin is warm and dry  Neurological:      General: No focal deficit present  Mental Status: He is alert  Discharge instructions/Information to patient and family:   See after visit summary for information provided to patient and family  Provisions for Follow-Up Care:  See after visit summary for information related to follow-up care and any pertinent home health orders  Disposition:     Home      Planned Readmission: No     Discharge Statement:  I spent 25 minutes discharging the patient  This time was spent on the day of discharge  I had direct contact with the patient on the day of discharge   Greater than 50% of the total time was spent examining patient, answering all patient questions, arranging and discussing plan of care with patient as well as directly providing post-discharge instructions  Additional time then spent on discharge activities  Discharge Medications:  See after visit summary for reconciled discharge medications provided to patient and family        ** Please Note: This note has been constructed using a voice recognition system **

## 2020-08-20 NOTE — PLAN OF CARE
Problem: Potential for Falls  Goal: Patient will remain free of falls  Description: INTERVENTIONS:  - Assess patient frequently for physical needs  -  Identify cognitive and physical deficits and behaviors that affect risk of falls    -  [place] fall precautions as indicated by assessment   - Educate patient/family on patient safety including physical limitations  - Instruct patient to call for assistance with activity based on assessment  - Modify environment to reduce risk of injury  - Consider OT/PT consult to assist with strengthening/mobility  Outcome: Progressing     Problem: RESPIRATORY - ADULT  Goal: Achieves optimal ventilation and oxygenation  Description: INTERVENTIONS:  - Assess for changes in respiratory status  - Assess for changes in mentation and behavior  - Position to facilitate oxygenation and minimize respiratory effort  - Oxygen administered by appropriate delivery if ordered  - Initiate smoking cessation education as indicated  - Encourage broncho-pulmonary hygiene including cough, deep breathe, Incentive Spirometry  - Assess the need for suctioning and aspirate as needed  - Assess and instruct to report SOB or any respiratory difficulty  - Respiratory Therapy support as indicated  Outcome: Progressing

## 2020-08-20 NOTE — NURSING NOTE
Patient verbalized understanding of discharge instructions  Patient's IV removed  Patient discharged in stable condition

## 2020-08-20 NOTE — ASSESSMENT & PLAN NOTE
Pt presented to the ED today for evaluation of severe intermittent chest pain associated with SOB, diaphoresis, cough which has been intermittent for the past 3 weeks  He states he has had JACINTO since the beginning of the year  His chest pain is described as a sharp/stabbing sensation, and is sometimes worse with movement, other times the pain comes on unprovoked by movement or activity  He also describes worsening LE edema in the past year  He has not been to PCP in many years  Recently was seen at a clinic in [facility] and prescribed a medication for his lower extremity edema but is not longer taking this    - EKG showed sinus tachycardia with T wave inversion in I, aVL, V5, V6 with no prior EKG available for comparison  - Initial troponin < 0 02, continue to trend  - CXR with mild cardiomegaly  - Pt received , SL nitro in ED with some improvement in pain  - Patient does not want any pain medications other than Tylenol as he states he has a history of ETOH/drug abuse   - Monitor on telemetry  - BP improved with SL nitro  - Add Lopressor BID, prn nitro  - Appreciate cardiology recommendations

## 2020-08-20 NOTE — ASSESSMENT & PLAN NOTE
WBC 11 84 and tachycardia on admission  CXR with mild cardiomegaly  COVID negative  Pt has chronic cough with minimal sputum production  UA was unremarkable  Count has normalized and patient did not have any fever

## 2020-08-20 NOTE — ASSESSMENT & PLAN NOTE
BP elevated in ED, noted to be 187/117  BP improved after nitro  Continue to monitor  BB added in setting of suspected ACS, continue Lopressor Q12

## 2020-08-20 NOTE — ASSESSMENT & PLAN NOTE
BP elevated in ED, noted to be 187/117  BP improved after nitro  Patient was initially started on Lopressor 25 milligram p o  B i d  With improved blood pressure  Patient to be discharged on lisinopril/hydrochlorothiazide 20/25 milligram p o   Daily

## 2020-08-20 NOTE — PHYSICAL THERAPY NOTE
PT EVALUATION    Pt is independent with ADLs and not in need of skilled OT services at this time  Pt will cont with PT      08/20/20 0910   Note Type   Note type Eval/Treat   Pain Assessment   Pain Assessment Tool 0-10   Pain Score 6   Pain Location/Orientation Location: Knee;Orientation: Left   Home Living   Type of Home House  ([address])   Home Layout Two level  (bathroom upstairs)   Home Equipment   (no DME per pt)   Prior Function   Level of [place] Independent with ADLs and functional mobility   Lives With Friend(s)   Receives Help From Friend(s)   ADL Assistance Independent   IADLs Independent   Comments Pt amb w/out AD PTA   Restrictions/Precautions   Other Precautions Pain; Fall Risk  (SOB)   General   Additional Pertinent History Pt adm with chest pain  Family/Caregiver Present No   Cognition   Overall Cognitive Status WFL   Arousal/Participation Cooperative   Orientation Level Oriented X4   Following Commands Follows all commands and directions without difficulty   RLE Assessment   RLE Assessment WFL  (4-/5)   LLE Assessment   LLE Assessment WFL  (3+ to 4-/5)   Transfers   Sit to Stand 7  Independent   Stand to Sit 7  Independent   Ambulation/Elevation   Gait pattern Antalgic   Gait Assistance 5  Supervision   Additional items Verbal cues; Tactile cues   Assistive Device None   Distance 20 feet with change in direction;pt moderately SOB with ambulation   Balance   Static Sitting Good   Static Standing Good   Dynamic Standing Fair   Ambulatory Fair -   Activity Tolerance   Activity Tolerance Patient limited by fatigue;Patient limited by pain  (SOB)   Assessment   Problem List Decreased range of motion;Decreased strength;Decreased endurance; Impaired balance;Decreased mobility; Decreased coordination;Decreased safety awareness; Obesity;Pain   Assessment Patient seen for Physical Therapy evaluation  Patient admitted with Chest pain    Comorbidities affecting patient's physical performance include: HTN, SIRS, history of left knee pain  Personal factors affecting patient at time of initial evaluation include: lives in two story house, stairs to enter home, inability to navigate community distances, inability to navigate level surfaces without external assistance, inability to perform dynamic tasks in community and limited home support  Prior to admission, patient was independent with functional mobility without assistive device, independent with ADLS, independent with IADLS, living with friends in a one level home with 5 steps to enter, ambulating household distance and ambulating community distances  Please find objective findings from Physical Therapy assessment regarding body systems outlined above with impairments and limitations including weakness, decreased ROM, impaired balance, decreased endurance, impaired coordination, gait deviations, pain, decreased activity tolerance, decreased functional mobility tolerance, decreased safety awareness and fall risk  The Barthel Index was used as a functional outcome tool presenting with a score of 85 today indicating minimal limitations of functional mobility and ADLS  Patient's clinical presentation is currently unstable/unpredictable as seen in patient's presentation of vital sign response, changing level of pain, increased fall risk, new onset of impairment of functional mobility, decreased endurance and new onset of weakness  Pt would benefit from continued Physical Therapy treatment to address deficits as defined above and maximize level of functional mobility  As demonstrated by objective findings, the assigned level of complexity for this evaluation is high     Goals   Patient Goals "less pain in my knee"   STG Expiration Date 08/27/20   Short Term Goal #1 With less pain, pt will amb with LRD functional household distances - S/I; balance with LRD - F/F+ for safe mobility and to decrease fall risk   Short Term Goal #2 With less pain, pt will amb up/down full flight of steps - S/I so pt can access all areas of his home   LTG Expiration Date 09/03/20   Long Term Goal #1 Pt will return to independent functional mobility with/wout AD, home and community distances, levels/unlevels   Long Term Goal #2 strength LEs - 4- to 4/5; balance - G for safe mobility and to decrease fall risk   Plan   Treatment/Interventions ADL retraining;Functional transfer training;LE strengthening/ROM; Elevations; Therapeutic exercise; Endurance training;Patient/family training;Equipment eval/education; Bed mobility;Gait training; Compensatory technique education   PT Frequency 5x/wk   Recommendation   PT Discharge Recommendation Other (Comment)  (OP PT)   Equipment Recommended   (possible cane/RW)   Barthel Index   Feeding 10   Bathing 5   Grooming Score 5   Dressing Score 10   Bladder Score 10   Bowels Score 10   Toilet Use Score 10   Transfers (Bed/Chair) Score 10   Mobility (Level Surface) Score 10   Stairs Score 5   Barthel Index Score 80   Licensure   [place] License Number  Sirena Aquino [place], [place] [id number]     Time DO:0505  Time Out:0950  Total Time: 10 mins      S:  Pt reports left knee pain  O:  Pt trans sit to stand - I  Pt amb with cane x 25 feet with S;rest;pt then amb with RW x 25 feet  Pt trans stand to sit - I   A: Gait most improved with RW vs cane of no AD  Pt issued a cane for home use  Pt does not want a RW for home use  Pt will benefit from cont skilled PT services to increase pt's strength and mobility  P:  Cont per PT POC - DCP - OP PT      [address] [place], [place]   [id number]

## 2020-08-20 NOTE — ASSESSMENT & PLAN NOTE
Pt presented to the ED today for evaluation of severe intermittent chest pain associated with SOB, diaphoresis, cough which has been intermittent for the past 3 weeks  He states he has had JACINTO since the beginning of the year  His chest pain is described as a sharp/stabbing sensation, and is sometimes worse with movement, other times the pain comes on unprovoked by movement or activity  He also describes worsening LE edema in the past year  He has not been to PCP in many years  Recently was seen at a clinic in [place] and prescribed a medication for his lower extremity edema but is not longer taking this  - EKG showed sinus tachycardia with T wave inversion in I, aVL, V5, V6 with no prior EKG available for comparison  Serial troponins were negative  - CXR with mild cardiomegaly  - Patient does not want any pain medications other than Tylenol as he states he has a history of ETOH/drug abuse   Telemetry did not show any evidence of arrhythmias  - BP improved with SL nitro  Patient received nitro and was started on Lopressor with improved blood pressure  Patient to be discharged on lisinopril/hydrochlorothiazide 20/25 milligram p o   Daily

## 2020-08-20 NOTE — CONSULTS
Consultation - Cardiology   Telma Bradley 47 y o  male MRN: [id number]  Unit/Bed#: [address] [place] [address] Encounter: [id number]  08/20/20  4:50 PM          Physician Requesting Consult: Jordi Del Toro MD  Reason for Consult / Principal Problem: Chest pain      Assessment:  1  Chest pain - atypical   Stress test done today was normal without any ischemia or infarction noted  Likely noncardiac cause of pain  2  Mild-Moderate Aortic stenosis - aortic valve could not be visualized well enough to rule out bicuspid valve  Will need followup as outpatient  3  Morbid obesity - discussed with patient in detail  Will need to make significant lifestyle changes and should consider bariatric surgery as BMI is now > 55  4  LE edema - likely dependent edema due to lack of mobility and obesity  In addition, he has elevated BP and likely diastolic dysfunction  5  Hypertension - Begin lisinopril/HCTZ and followup as outpatient  Plan:  As above  Discussed with hospitalist        History of Present Illness   HPI: Telma Bradley is a 47y o  year old male who presents with chest pain  The patient had 2 episodes of chest pain yesterday with the first occurring while he was at [facility] and the second occurring while he was talking on the phone  He describes them as sharp, initial episode was on the left side of his chest and second episode was on the right  There was no radiation  He has been feeling palpitations recently too which he describes as a flipping sensation in his chest    He has had multiple similar episodes over the past 6 months but has not sought medical attention  For the past year he has noticed LE edema and shortness of breath with exertion  He has gained a large amount of weight over the past few years and now weighs over 420 pounds  He attributes his weight gain to poor diet and lack of exercise  He has multiple orthopedic issues and has difficulty ambulating        Review of Systems:    Review of Systems Constitutional: Positive for fatigue  Negative for chills and fever  HENT: Negative for congestion, nosebleeds and postnasal drip  Respiratory: Negative for cough, chest tightness and shortness of breath  Cardiovascular: Positive for chest pain, palpitations and leg swelling  Gastrointestinal: Negative for abdominal distention, abdominal pain, diarrhea, nausea and vomiting  Endocrine: Negative for polydipsia, polyphagia and polyuria  Musculoskeletal: Positive for arthralgias, gait problem, myalgias and neck pain  Skin: Negative for color change, pallor and rash  Allergic/Immunologic: Negative for environmental allergies, food allergies and immunocompromised state  Neurological: Negative for dizziness, seizures, syncope and light-headedness  Hematological: Negative for adenopathy  Does not bruise/bleed easily  Psychiatric/Behavioral: Negative for dysphoric mood  The patient is not nervous/anxious          Historical Information   Past Medical History:   Diagnosis Date    Hypertension     Psychiatric disorder     depression     Past Surgical History:   Procedure Laterality Date    ABDOMINAL SURGERY      SPLENECTOMY, TOTAL      VASECTOMY       Social History     Substance and Sexual Activity   Alcohol Use Not Currently    Frequency: Never    Drinks per session: Patient refused    Binge frequency: Patient refused    Comment: Hx of ETOH abuse; has been sober for 8 years     Social History     Substance and Sexual Activity   Drug Use Yes    Frequency: 2 0 times per week    Types: Marijuana     Social History     Tobacco Use   Smoking Status Current Every Day Smoker    Packs/day: 1 00   Smokeless Tobacco Never Used       Family History:   Family History   Family history unknown: Yes       Meds/Allergies   current meds:   Current Facility-Administered Medications   Medication Dose Route Frequency    acetaminophen (TYLENOL) tablet 650 mg  650 mg Oral Q4H PRN    calcium carbonate (TUMS) chewable tablet 1,000 mg  1,000 mg Oral Daily PRN    enoxaparin (LOVENOX) subcutaneous injection 40 mg  40 mg Subcutaneous Daily    ipratropium-albuterol (DUO-NEB) 0 5-2 5 mg/3 mL inhalation solution 3 mL  3 mL Nebulization Q6H    metoprolol (LOPRESSOR) injection 2 5 mg  2 5 mg Intravenous Q6H PRN    metoprolol tartrate (LOPRESSOR) tablet 25 mg  25 mg Oral Q12H ESTHER    morphine injection 2 mg  2 mg Intravenous Q4H PRN    nitroglycerin (NITRO-BID) 2 % TD ointment 1 inch  1 inch Topical Once    nitroglycerin (NITROSTAT) SL tablet 0 4 mg  0 4 mg Sublingual Q5 Min PRN     No Known Allergies    Objective   Vitals: Blood pressure 146/88, pulse 86, temperature 98 2 °F (36 8 °C), temperature source Oral, resp  rate 19, height 6' (1 829 m), weight (!) 195 kg (429 lb), SpO2 94 %  , Body mass index is 58 18 kg/m²  Physical Exam   Constitutional: He appears healthy  No distress  Eyes: Pupils are equal, round, and reactive to light  Conjunctivae are normal    Neck: Normal range of motion  Neck supple  No JVD present  Cardiovascular: Normal rate and regular rhythm  Exam reveals no gallop and no friction rub  Murmur heard  Medium-pitched systolic murmur is present with a grade of 3/6 at the upper right sternal border  Pulmonary/Chest: Effort normal and breath sounds normal  He has no wheezes  He has no rales  Abdominal: Soft  He exhibits no distension  There is no abdominal tenderness  Musculoskeletal:         General: Edema present  Neurological: He is alert and oriented to person, place, and time  Skin: Skin is warm and dry         Lab Results:     Troponins:   Results from last 7 days   Lab Units 08/20/20  0030 08/19/20  2108 08/19/20  1702   TROPONIN I ng/mL <0 02 <0 02 <0 02       CBC with diff:   Results from last 7 days   Lab Units 08/20/20  0524 08/19/20  1702   WBC Thousand/uL 9 24 11 84*   HEMOGLOBIN g/dL 14 5 13 7   HEMATOCRIT % 45 5 42 2   MCV fL 91 91   PLATELETS Thousands/uL 250 248   MCH pg 29 0 29 5   MCHC g/dL 31 9 32 5   RDW % 13 8 13 6   MPV fL 9 7 9 3   NRBC AUTO /100 WBCs 0 0       CMP:   Results from last 7 days   Lab Units 08/20/20  0524 08/19/20  1702   POTASSIUM mmol/L 3 9 3 9   CHLORIDE mmol/L 100 100   CO2 mmol/L 31 31   BUN mg/dL 15 18   CREATININE mg/dL 0 91 0 88   CALCIUM mg/dL 8 5 9 2   AST U/L  --  12   ALT U/L  --  22   ALK PHOS U/L  --  103   EGFR ml/min/1 73sq m 95 97       Magnesium:       Coags:   Results from last 7 days   Lab Units 08/19/20  1702   PTT seconds 29   INR  1 01       Lipid Profile:   Results from last 7 days   Lab Units 08/20/20  0524   TRIGLYCERIDES mg/dL 93   HDL mg/dL 36*   LDL CALC mg/dL 80         Cardiac testing: All previous testing has been reviewed  See HPI for summary        EKG: Personally reviewed: sinus tachycardia with T wave inversions laterally    Imaging: I have personally reviewed pertinent films in PACS

## 2020-08-20 NOTE — H&P
[facility]  H&P- Kasey Linares 1965, 47 y o  male MRN: [id number]  Unit/Bed#: [address] [place] [address] Encounter: [id number]  Primary Care Provider: No primary care provider on file  Date and time admitted to hospital: 8/19/2020  4:33 PM    * Chest pain  Assessment & Plan  Pt presented to the ED today for evaluation of severe intermittent chest pain associated with SOB, diaphoresis, cough which has been intermittent for the past 3 weeks  He states he has had JACINTO since the beginning of the year  His chest pain is described as a sharp/stabbing sensation, and is sometimes worse with movement, other times the pain comes on unprovoked by movement or activity  He also describes worsening LE edema in the past year  He has not been to PCP in many years  Recently was seen at a clinic in [place] and prescribed a medication for his lower extremity edema but is not longer taking this    - EKG showed sinus tachycardia with T wave inversion in I, aVL, V5, V6 with no prior EKG available for comparison  - Initial troponin < 0 02, continue to trend  - CXR with mild cardiomegaly  - Pt received , SL nitro in ED with some improvement in pain  - Patient does not want any pain medications other than Tylenol as he states he has a history of ETOH/drug abuse   - Monitor on telemetry  - BP improved with SL nitro  - Add Lopressor BID, prn nitro  - Appreciate cardiology recommendations    SIRS (systemic inflammatory response syndrome) (HCC)  Assessment & Plan  WBC 11 84 and tachycardia on admission  CXR with mild cardiomegaly  COVID negative  Pt has chronic cough with minimal sputum production  UA pending  Pt denies fevers, chills  Otherwise no signs of infection at this time, repeat CBC in AM  Procal pending     Hypertension  Assessment & Plan  BP elevated in ED, noted to be 187/117  BP improved after nitro  Continue to monitor  BB added in setting of suspected ACS, continue Lopressor Q12      VTE Prophylaxis: Enoxaparin (Lovenox)  / sequential compression device   Code Status: Level 1  POLST: POLST is not applicable to this patient  Discussion with family: Offered but patient declined     Anticipated Length of Stay:  Patient will be admitted on an Observation basis with an anticipated length of stay of  < 2 midnights  Justification for Hospital Stay: chest pain r/o ACS    Total Time for Visit, including Counseling / Coordination of Care: 45 minutes  Greater than 50% of this total time spent on direct patient counseling and coordination of care  Chief Complaint:   Chest pain    History of Present Illness:    Shalonda Gusman is a 47 y o  male who presents with PMH of recently diagnosed HTN not on any medications, history of depression  He presented to the ED today for evaluation of chest pain that has been off and on for the past few months, but happening more frequently in the past 3 weeks  He states that the chest pain goes across his entire chest and feels like a sharp/stabbing pain, at its worst an 8/10  This is sometimes associated with SOB and diaphoresis  He states that he feels SOB with minimal exertion, and this is been going on since the beginning of the year  He does report a chronic cough for many months and is an everyday smoker  He recently went to a clinic 4 days ago and states that he was given a water pill for the swelling his legs, and told to keep track of his blood pressure at home, which was trending in the 170s for the 4-5 reading he obtained  He is not sure exactly what pill or dose he was given, but has not taken it today  He states he has not been to see a doctor in many years  He states his legs have been more swollen over the past month    He expresses concern about being in the hospital, and states that his family had to convince him to come, as patient is hesitant to seek out medical care due to poor experiences in the past  He states he does have difficulty reading/writing and the last time he was given discharge instructions with medications to fill and appointments to follow up with, he had difficulty doing so  Initial troponin in the ED was negative, and EKG showed T wave inversions in I, aVL, V5, V6 with no prior for comparison  He received a full dose of ASA and nitro in the ED and will be admitted to medicine for ACS r/o  Review of Systems:    Review of Systems   Constitutional: Positive for diaphoresis (w minimal exertion, sometimes associated w/ chest pain)  Negative for chills and fever  HENT: Negative for congestion and sore throat  Eyes: Negative for visual disturbance  Respiratory: Positive for cough and shortness of breath  Negative for chest tightness and wheezing  Cardiovascular: Positive for chest pain and leg swelling  Negative for palpitations  Gastrointestinal: Negative for abdominal distention, abdominal pain, diarrhea, nausea and vomiting  Genitourinary: Negative for dysuria, frequency and urgency  Musculoskeletal: Positive for arthralgias (L knee, chronic)  Negative for back pain, gait problem and myalgias  Skin: Negative for color change, rash and wound  Neurological: Negative for dizziness, weakness, light-headedness, numbness and headaches  Past Medical and Surgical History:     Past Medical History:   Diagnosis Date    Hypertension     Psychiatric disorder     depression       Past Surgical History:   Procedure Laterality Date    ABDOMINAL SURGERY      SPLENECTOMY, TOTAL      VASECTOMY         Meds/Allergies:    Prior to Admission medications    Medication Sig Start Date End Date Taking?  Authorizing Provider   cyclobenzaprine (FLEXERIL) 10 mg tablet Take 1 tablet (10 mg total) by mouth 2 (two) times a day as needed for muscle spasms  Patient not taking: Reported on 8/19/2020 7/19/19   Zac Polk MD   naproxen (NAPROSYN) 250 mg tablet Take 1 tablet (250 mg total) by mouth 2 (two) times a day with meals  Patient not taking: Reported on 8/19/2020 7/19/19   Kavon Vazquez MD     I have reviewed home medications with patient personally  Pt does not take any medications on a daily basis  Allergies: No Known Allergies    Social History:    Social History     Substance and Sexual Activity   Alcohol Use Not Currently    Comment: Hx of ETOH abuse; has been sober for 8 years     Social History     Tobacco Use   Smoking Status Current Every Day Smoker    Packs/day: 1 00   Smokeless Tobacco Never Used     Social History     Substance and Sexual Activity   Drug Use Yes    Frequency: 2 0 times per week    Types: Marijuana       Family History:    Family History   Family history unknown: Yes       Physical Exam:     Vitals:   Blood Pressure: 165/94 (08/19/20 2015)  Pulse: 84 (08/19/20 2127)  Temperature: 98 3 °F (36 8 °C) (08/19/20 2015)  Temp Source: Oral (08/19/20 2015)  Respirations: 18 (08/19/20 2127)  Height: 6' (182 9 cm) (08/19/20 2015)  Weight - Scale: (!) 195 kg (429 lb) (08/19/20 1640)  SpO2: 95 % (08/19/20 2127)    Physical Exam  Vitals signs reviewed  Constitutional:       Appearance: He is well-developed  He is obese  He is not diaphoretic  HENT:      Head: Normocephalic and atraumatic  Cardiovascular:      Rate and Rhythm: Normal rate and regular rhythm  Heart sounds: Normal heart sounds  No murmur  No friction rub  Pulmonary:      Effort: Pulmonary effort is normal  No respiratory distress  Breath sounds: Normal breath sounds  No wheezing, rhonchi or rales  Chest:      Chest wall: No tenderness  Abdominal:      General: Bowel sounds are normal       Palpations: Abdomen is soft  Tenderness: There is no abdominal tenderness  There is no guarding  Musculoskeletal:         General: No tenderness  Right lower leg: Edema (2+ pitting edema) present  Left lower leg: Edema (2+ pitting edema) present  Skin:     General: Skin is warm and dry  Findings: No erythema or rash     Neurological:      Mental Status: He is alert and oriented to person, place, and time  Additional Data:     Lab Results: I have personally reviewed pertinent reports  Results from last 7 days   Lab Units 08/19/20  1702   WBC Thousand/uL 11 84*   HEMOGLOBIN g/dL 13 7   HEMATOCRIT % 42 2   PLATELETS Thousands/uL 248   NEUTROS PCT % 70   LYMPHS PCT % 18   MONOS PCT % 11   EOS PCT % 1     Results from last 7 days   Lab Units 08/19/20  1702   SODIUM mmol/L 138   POTASSIUM mmol/L 3 9   CHLORIDE mmol/L 100   CO2 mmol/L 31   BUN mg/dL 18   CREATININE mg/dL 0 88   ANION GAP mmol/L 7   CALCIUM mg/dL 9 2   ALBUMIN g/dL 3 3*   TOTAL BILIRUBIN mg/dL 0 30   ALK PHOS U/L 103   ALT U/L 22   AST U/L 12   GLUCOSE RANDOM mg/dL 88     Results from last 7 days   Lab Units 08/19/20  1702   INR  1 01         Results from last 7 days   Lab Units 08/19/20  1702   HEMOGLOBIN A1C % 5 6           Imaging: I have personally reviewed pertinent reports  XR chest 1 view portable   ED Interpretation by Rand Pereyra MD (82/23 1256)   CM      Final Result by Amy Lu MD (08/19 1912)      Mild cardiomegaly  No acute cardiopulmonary disease  Workstation performed: [id number]             EKG, Pathology, and Other Studies Reviewed on Admission:   · EKG:  Sinus tachycardia, T-wave inversions in 1, aVL, V5, V6    Allscripts / Epic Records Reviewed: Yes     ** Please Note: This note has been constructed using a voice recognition system   **

## 2020-08-20 NOTE — PLAN OF CARE
Problem: Potential for Falls  Goal: Patient will remain free of falls  Description: INTERVENTIONS:  - Assess patient frequently for physical needs  -  Identify cognitive and physical deficits and behaviors that affect risk of falls    -  [place] fall precautions as indicated by assessment   - Educate patient/family on patient safety including physical limitations  - Instruct patient to call for assistance with activity based on assessment  - Modify environment to reduce risk of injury  - Consider OT/PT consult to assist with strengthening/mobility  8/20/2020 1754 by Jennifer Ayala RN  Outcome: Completed  8/20/2020 1009 by Jennifer Ayala RN  Outcome: Progressing     Problem: RESPIRATORY - ADULT  Goal: Achieves optimal ventilation and oxygenation  Description: INTERVENTIONS:  - Assess for changes in respiratory status  - Assess for changes in mentation and behavior  - Position to facilitate oxygenation and minimize respiratory effort  - Oxygen administered by appropriate delivery if ordered  - Initiate smoking cessation education as indicated  - Encourage broncho-pulmonary hygiene including cough, deep breathe, Incentive Spirometry  - Assess the need for suctioning and aspirate as needed  - Assess and instruct to report SOB or any respiratory difficulty  - Respiratory Therapy support as indicated  8/20/2020 1754 by Jennifer Ayala RN  Outcome: Completed  8/20/2020 1009 by Jennifer Ayala RN  Outcome: Progressing

## 2020-08-20 NOTE — UTILIZATION REVIEW
Initial Clinical Review    Admission: Date/Time/Statement:   Admission Orders (From admission, onward)     Ordered        08/19/20 1831  Place in Observation (expected length of stay for this patient is less than two midnights)  Once                   Orders Placed This Encounter   Procedures    Place in Observation (expected length of stay for this patient is less than two midnights)     Standing Status:   Standing     Number of Occurrences:   1     Order Specific Question:   Admitting Physician     Answer:   Gianna Garza     Order Specific Question:   Level of Care     Answer:   Med Surg [16]     ED Arrival Information     Expected Arrival Acuity Means of Arrival Escorted By Service Admission Type    - 8/19/2020 16:29 Urgent Walk-In Self General Medicine Urgent    Arrival Complaint    Chest Pain; Leg Pain        Chief Complaint   Patient presents with    Chest Pain     c/o pain in chest with inspiration simnce Aprilwith a cough since Feb    Cough     Assessment/Plan: 47 y o  male who presents with PMH of recently diagnosed HTN not on any medications, history of depression  He presented to the ED today for evaluation of chest pain that has been off and on for the past few months, but happening more frequently in the past 3 weeks  He states that the chest pain goes across his entire chest and feels like a sharp/stabbing pain, at its worst an 8/10  This is sometimes associated with SOB and diaphoresis  He states that he feels SOB with minimal exertion, and this is been going on since the beginning of the year  He does report a chronic cough for many months and is an everyday smoker  He recently went to a clinic 4 days ago and states that he was given a water pill for the swelling his legs, and told to keep track of his blood pressure at home, which was trending in the 170s for the 4-5 reading he obtained  He is not sure exactly what pill or dose he was given, but has not taken it today  Initial troponin in the ED was negative, and EKG showed T wave inversions in I, aVL, V5, V6 with no prior for comparison  He received a full dose of ASA and nitro in the ED and will be admitted to medicine for ACS r/o  Chest pain  Assessment & Plan  Pt presented to the ED today for evaluation of severe intermittent chest pain associated with SOB, diaphoresis, cough which has been intermittent for the past 3 weeks  He states he has had JACINTO since the beginning of the year  His chest pain is described as a sharp/stabbing sensation, and is sometimes worse with movement, other times the pain comes on unprovoked by movement or activity  He also describes worsening LE edema in the past year  He has not been to PCP in many years  Recently was seen at a clinic in [place] and prescribed a medication for his lower extremity edema but is not longer taking this    - EKG showed sinus tachycardia with T wave inversion in I, aVL, V5, V6 with no prior EKG available for comparison  - Initial troponin < 0 02, continue to trend  - CXR with mild cardiomegaly  - Pt received , SL nitro in ED with some improvement in pain  - Patient does not want any pain medications other than Tylenol as he states he has a history of ETOH/drug abuse   - Monitor on telemetry  - BP improved with SL nitro  - Add Lopressor BID, prn nitro  - Appreciate cardiology recommendations     SIRS (systemic inflammatory response syndrome) (HCC)  Assessment & Plan  WBC 11 84 and tachycardia on admission  CXR with mild cardiomegaly  COVID negative  Pt has chronic cough with minimal sputum production  UA pending  Pt denies fevers, chills  Otherwise no signs of infection at this time, repeat CBC in AM  Procal pending      Hypertension  Assessment & Plan  BP elevated in ED, noted to be 187/117  BP improved after nitro  Continue to monitor  BB added in setting of suspected ACS, continue Lopressor Q12       VTE Prophylaxis: Enoxaparin (Lovenox)  / sequential compression device Code Status: Level 1  POLST: POLST is not applicable to this patient  Discussion with family: Offered but patient declined      Anticipated Length of Stay:  Patient will be admitted on an Observation basis with an anticipated length of stay of  < 2 midnights     Justification for Hospital Stay: chest pain r/o ACS  ED Triage Vitals [08/19/20 1640]   Temperature Pulse Respirations Blood Pressure SpO2   98 2 °F (36 8 °C) 104 20 (!) 187/117 94 %      Temp Source Heart Rate Source Patient Position - Orthostatic VS BP Location FiO2 (%)   Tympanic Monitor Sitting Right arm --      Pain Score       5          Wt Readings from Last 1 Encounters:   08/19/20 (!) 195 kg (429 lb)     Additional Vital Signs:   Pertinent Labs/Diagnostic Test Results:   Results from last 7 days   Lab Units 08/19/20  1702   SARS-COV-2  Negative     Results from last 7 days   Lab Units 08/20/20  0524 08/19/20  1702   WBC Thousand/uL 9 24 11 84*   HEMOGLOBIN g/dL 14 5 13 7   HEMATOCRIT % 45 5 42 2   PLATELETS Thousands/uL 250 248   NEUTROS ABS Thousands/µL 5 65 8 12*         Results from last 7 days   Lab Units 08/20/20  0524 08/19/20  1702   SODIUM mmol/L 136 138   POTASSIUM mmol/L 3 9 3 9   CHLORIDE mmol/L 100 100   CO2 mmol/L 31 31   ANION GAP mmol/L 5 7   BUN mg/dL 15 18   CREATININE mg/dL 0 91 0 88   EGFR ml/min/1 73sq m 95 97   CALCIUM mg/dL 8 5 9 2     Results from last 7 days   Lab Units 08/19/20  1702   AST U/L 12   ALT U/L 22   ALK PHOS U/L 103   TOTAL PROTEIN g/dL 8 0   ALBUMIN g/dL 3 3*   TOTAL BILIRUBIN mg/dL 0 30     Results from last 7 days   Lab Units 08/20/20  0524 08/19/20  1702   GLUCOSE RANDOM mg/dL 104 88     Results from last 7 days   Lab Units 08/19/20  1702   HEMOGLOBIN A1C % 5 6   EAG mg/dl 114     Results from last 7 days   Lab Units 08/20/20  0030 08/19/20  2108 08/19/20  1702   TROPONIN I ng/mL <0 02 <0 02 <0 02     Results from last 7 days   Lab Units 08/19/20  1702   PROTIME seconds 13 2   INR  1 01   PTT seconds 29 Results from last 7 days   Lab Units 08/19/20  1702   NT-PRO BNP pg/mL 95     Results from last 7 days   Lab Units 08/20/20  0546   CLARITY UA  Clear   COLOR UA  Yellow   SPEC GRAV UA  1 020   PH UA  5 5   GLUCOSE UA mg/dl Negative   KETONES UA mg/dl Negative   BLOOD UA  Negative   PROTEIN UA mg/dl Trace*   NITRITE UA  Negative   BILIRUBIN UA  Negative   UROBILINOGEN UA E U /dl 0 2   LEUKOCYTES UA  Negative   WBC UA /hpf 0-1*   RBC UA /hpf None Seen   BACTERIA UA /hpf Occasional   EPITHELIAL CELLS WET PREP /hpf Occasional       ED Treatment:   Medication Administration from 08/19/2020 1629 to 08/19/2020 1945       Date/Time Order Dose Route Action Action by Comments     08/19/2020 1706 nitroglycerin (NITROSTAT) SL tablet 0 4 mg 0 4 mg Sublingual Given Melonie Salvador RN      08/19/2020 1704 aspirin chewable tablet 324 mg 324 mg Oral Given Melonie Salvador RN      08/19/2020 1819 nitroglycerin (NITRO-BID) 2 % TD ointment 1 inch 1 inch Topical Not Given Phoenix Richards RN hold as per Dr Tim Kerns     28/46/7872 0865 acetaminophen (TYLENOL) tablet 650 mg 650 mg Oral Given Phoenix Richards RN         Past Medical History:   Diagnosis Date    Hypertension     Psychiatric disorder     depression     Present on Admission:   Chest pain   Hypertension   SIRS (systemic inflammatory response syndrome) (HCC)      Admitting Diagnosis: Morbid obesity (HCC) [E66 01]  Cough [R05]  Chest pain [R07 9]  Hypertension [I10]  Abnormal EKG [R94 31]  Age/Sex: 47 y o  male  Admission Orders:  Tele mon  hepatits c antbody  procalcitonin  ua cs  Npo  echo  Scheduled Medications:  enoxaparin, 40 mg, Subcutaneous, Daily  ipratropium-albuterol, 3 mL, Nebulization, Q6H  metoprolol tartrate, 25 mg, Oral, Q12H [address]  nitroglycerin, 1 inch, Topical, Once      Continuous IV Infusions:     PRN Meds:  acetaminophen, 650 mg, Oral, Q4H PRN  calcium carbonate, 1,000 mg, Oral, Daily PRN  metoprolol, 2 5 mg, Intravenous, Q6H PRN  morphine injection, 2 mg, Intravenous, Q4H PRN  nitroglycerin, 0 4 mg, Sublingual, Q5 Min PRN        IP CONSULT TO CARDIOLOGY  IP CONSULT TO CASE MANAGEMENT    Network Utilization Review Department  [email or web address]  ATTENTION: Please call with any questions or concerns to [phone] and carefully listen to the prompts so that you are directed to the right person  All voicemails are confidential   [facility] all requests for admission clinical reviews, approved or denied determinations and any other requests to dedicated fax number below belonging to the campus where the patient is receiving treatment   List of dedicated fax numbers for the Facilities:  [address] DENIALS (Administrative/Medical Necessity) [phone]   [address] (Maternity/NICU/Pediatrics) [phone]   Shahla Schroeder [phone]   Barbara Kumar [phone]   Cullen Opitz [phone]   Skinny Loco [phone]   [address] [facility] [address] [place] [address] [phone]   [facility]  [phone]   [address] [facility] [address] [facility]  [address] [facility] [address] [facility] [phone]

## 2020-08-20 NOTE — ASSESSMENT & PLAN NOTE
WBC 11 84 and tachycardia on admission  CXR with mild cardiomegaly  COVID negative  Pt has chronic cough with minimal sputum production  UA pending  Pt denies fevers, chills  Otherwise no signs of infection at this time, repeat CBC in AM  Procal pending

## 2020-08-20 NOTE — DISCHARGE INSTRUCTIONS
Follow-up primary care physician for blood pressure check  You will also need a follow-up with cardiologist regarding your tight valve for serial monitoring

## 2020-08-24 LAB
ATRIAL RATE: 101 BPM
ATRIAL RATE: 80 BPM
ATRIAL RATE: 85 BPM
P AXIS: 25 DEGREES
P AXIS: 26 DEGREES
P AXIS: 46 DEGREES
PR INTERVAL: 142 MS
PR INTERVAL: 144 MS
PR INTERVAL: 162 MS
QRS AXIS: 39 DEGREES
QRS AXIS: 42 DEGREES
QRS AXIS: 48 DEGREES
QRSD INTERVAL: 78 MS
QRSD INTERVAL: 82 MS
QRSD INTERVAL: 84 MS
QT INTERVAL: 356 MS
QT INTERVAL: 406 MS
QT INTERVAL: 414 MS
QTC INTERVAL: 461 MS
QTC INTERVAL: 477 MS
QTC INTERVAL: 483 MS
T WAVE AXIS: 125 DEGREES
T WAVE AXIS: 150 DEGREES
T WAVE AXIS: 162 DEGREES
VENTRICULAR RATE: 101 BPM
VENTRICULAR RATE: 80 BPM
VENTRICULAR RATE: 85 BPM

## 2020-08-24 PROCEDURE — 93010 ELECTROCARDIOGRAM REPORT: CPT | Performed by: INTERNAL MEDICINE

## 2020-09-15 ENCOUNTER — OFFICE VISIT (OUTPATIENT)
Dept: FAMILY MEDICINE CLINIC | Facility: CLINIC | Age: 55
End: 2020-09-15
Payer: COMMERCIAL

## 2020-09-15 VITALS
SYSTOLIC BLOOD PRESSURE: 140 MMHG | HEART RATE: 109 BPM | HEIGHT: 71 IN | RESPIRATION RATE: 20 BRPM | OXYGEN SATURATION: 95 % | TEMPERATURE: 97.6 F | WEIGHT: 315 LBS | BODY MASS INDEX: 44.1 KG/M2 | DIASTOLIC BLOOD PRESSURE: 82 MMHG

## 2020-09-15 DIAGNOSIS — I10 ESSENTIAL HYPERTENSION: ICD-10-CM

## 2020-09-15 DIAGNOSIS — Z12.12 SCREENING FOR COLORECTAL CANCER: ICD-10-CM

## 2020-09-15 DIAGNOSIS — Z00.00 ANNUAL PHYSICAL EXAM: Primary | ICD-10-CM

## 2020-09-15 DIAGNOSIS — Z12.11 SCREENING FOR COLORECTAL CANCER: ICD-10-CM

## 2020-09-15 PROCEDURE — 99386 PREV VISIT NEW AGE 40-64: CPT | Performed by: FAMILY MEDICINE

## 2020-09-15 RX ORDER — LISINOPRIL AND HYDROCHLOROTHIAZIDE 25; 20 MG/1; MG/1
1 TABLET ORAL DAILY
Qty: 30 TABLET | Refills: 2 | Status: SHIPPED | OUTPATIENT
Start: 2020-09-15 | End: 2020-10-06 | Stop reason: SDUPTHER

## 2020-09-15 NOTE — PATIENT INSTRUCTIONS
Wellness Visit for Adults   AMBULATORY CARE:   A wellness visit  is when you see your healthcare provider to get screened for health problems  You can also get advice on how to stay healthy  Write down your questions so you remember to ask them  Ask your healthcare provider how often you should have a wellness visit  What happens at a wellness visit:  Your healthcare provider will ask about your health, and your family history of health problems  This includes high blood pressure, heart disease, and cancer  He or she will ask if you have symptoms that concern you, if you smoke, and about your mood  You may also be asked about your intake of medicines, supplements, food, and alcohol  Any of the following may be done:  · Your weight  will be checked  Your height may also be checked so your body mass index (BMI) can be calculated  Your BMI shows if you are at a healthy weight  · Your blood pressure  and heart rate will be checked  Your temperature may also be checked  · Blood and urine tests  may be done  Blood tests may be done to check your cholesterol levels  Abnormal cholesterol levels increase your risk for heart disease and stroke  You may also need a blood or urine test to check for diabetes if you are at increased risk  Urine tests may be done to look for signs of an infection or kidney disease  · A physical exam  includes checking your heartbeat and lungs with a stethoscope  Your healthcare provider may also check your skin to look for sun damage  · Screening tests  may be recommended  A screening test is done to check for diseases that may not cause symptoms  The screening tests you may need depend on your age, gender, family history, and lifestyle habits  For example, colorectal screening may be recommended if you are 48years old or older  Screening tests you need if you are a woman:   · A Pap smear  is used to screen for cervical cancer   Pap smears are usually done every 3 to 5 years depending on your age  You may need them more often if you have had abnormal Pap smear test results in the past  Ask your healthcare provider how often you should have a Pap smear  · A mammogram  is an x-ray of your breasts to screen for breast cancer  Experts recommend mammograms every 2 years starting at age 48 years  You may need a mammogram at age 52 years or younger if you have an increased risk for breast cancer  Talk to your healthcare provider about when you should start having mammograms and how often you need them  Vaccines you may need:   · Get an influenza vaccine  every year  The influenza vaccine protects you from the flu  Several types of viruses cause the flu  The viruses change over time, so new vaccines are made each year  · Get a tetanus-diphtheria (Td) booster vaccine  every 10 years  This vaccine protects you against tetanus and diphtheria  Tetanus is a severe infection that may cause painful muscle spasms and lockjaw  Diphtheria is a severe bacterial infection that causes a thick covering in the back of your mouth and throat  · Get a human papillomavirus (HPV) vaccine  if you are female and aged 23 to 32 or male 23 to 24 and never received it  This vaccine protects you from HPV infection  HPV is the most common infection spread by sexual contact  HPV may also cause vaginal, penile, and anal cancers  · Get a pneumococcal vaccine  if you are aged 72 years or older  The pneumococcal vaccine is an injection given to protect you from pneumococcal disease  Pneumococcal disease is an infection caused by pneumococcal bacteria  The infection may cause pneumonia, meningitis, or an ear infection  · Get a shingles vaccine  if you are aged 61 or older, even if you have had shingles before  The shingles vaccine is an injection to protect you from the varicella-zoster virus  This is the same virus that causes chickenpox   Shingles is a painful rash that develops in people who had chickenpox or have been exposed to the virus  How to eat healthy:  My Plate is a model for planning healthy meals  It shows the types and amounts of foods that should go on your plate  Fruits and vegetables make up about half of your plate, and grains and protein make up the other half  A serving of dairy is included on the side of your plate  The amount of calories and serving sizes you need depends on your age, gender, weight, and height  Examples of healthy foods are listed below:  · Eat a variety of vegetables  such as dark green, red, and orange vegetables  You can also include canned vegetables low in sodium (salt) and frozen vegetables without added butter or sauces  · Eat a variety of fresh fruits , canned fruit in 100% juice, frozen fruit, and dried fruit  · Include whole grains  At least half of the grains you eat should be whole grains  Examples include whole-wheat bread, wheat pasta, brown rice, and whole-grain cereals such as oatmeal     · Eat a variety of protein foods such as seafood (fish and shellfish), lean meat, and poultry without skin (turkey and chicken)  Examples of lean meats include pork leg, shoulder, or tenderloin, and beef round, sirloin, tenderloin, and extra lean ground beef  Other protein foods include eggs and egg substitutes, beans, peas, soy products, nuts, and seeds  · Choose low-fat dairy products such as skim or 1% milk or low-fat yogurt, cheese, and cottage cheese  · Limit unhealthy fats  such as butter, hard margarine, and shortening  Exercise:  Exercise at least 30 minutes per day on most days of the week  Some examples of exercise include walking, biking, dancing, and swimming  You can also fit in more physical activity by taking the stairs instead of the elevator or parking farther away from stores  Include muscle strengthening activities 2 days each week  Regular exercise provides many health benefits   It helps you manage your weight, and decreases your risk for type 2 diabetes, heart disease, stroke, and high blood pressure  Exercise can also help improve your mood  Ask your healthcare provider about the best exercise plan for you  General health and safety guidelines:   · Do not smoke  Nicotine and other chemicals in cigarettes and cigars can cause lung damage  Ask your healthcare provider for information if you currently smoke and need help to quit  E-cigarettes or smokeless tobacco still contain nicotine  Talk to your healthcare provider before you use these products  · Limit alcohol  A drink of alcohol is 12 ounces of beer, 5 ounces of wine, or 1½ ounces of liquor  · Lose weight, if needed  Being overweight increases your risk of certain health conditions  These include heart disease, high blood pressure, type 2 diabetes, and certain types of cancer  · Protect your skin  Do not sunbathe or use tanning beds  Use sunscreen with a SPF 15 or higher  Apply sunscreen at least 15 minutes before you go outside  Reapply sunscreen every 2 hours  Wear protective clothing, hats, and sunglasses when you are outside  · Drive safely  Always wear your seatbelt  Make sure everyone in your car wears a seatbelt  A seatbelt can save your life if you are in an accident  Do not use your cell phone when you are driving  This could distract you and cause an accident  Pull over if you need to make a call or send a text message  · Practice safe sex  Use latex condoms if are sexually active and have more than one partner  Your healthcare provider may recommend screening tests for sexually transmitted infections (STIs)  · Wear helmets, lifejackets, and protective gear  Always wear a helmet when you ride a bike or motorcycle, go skiing, or play sports that could cause a head injury  Wear protective equipment when you play sports  Wear a lifejacket when you are on a boat or doing water sports    © 2017 [employer] Information is for End User's use only and may not be sold, redistributed or otherwise used for commercial purposes  All illustrations and images included in CareNotes® are the copyrighted property of [employer] M [employer]  or [employer]  The above information is an  only  It is not intended as medical advice for individual conditions or treatments  Talk to your doctor, nurse or pharmacist before following any medical regimen to see if it is safe and effective for you  Weight Management   AMBULATORY CARE:   Why it is important to manage your weight:  Being overweight increases your risk of health conditions such as heart disease, high blood pressure, type 2 diabetes, and certain types of cancer  It can also increase your risk for osteoarthritis, sleep apnea, and other respiratory problems  Aim for a slow, steady weight loss  Even a small amount of weight loss can lower your risk of health problems  How to lose weight safely:  A safe and healthy way to lose weight is to eat fewer calories and get regular exercise  You can lose up about 1 pound a week by decreasing the number of calories you eat by 500 calories each day  You can decrease calories by eating smaller portion sizes or by cutting out high-calorie foods  Read labels to find out how many calories are in the foods you eat  You can also burn calories with exercise such as walking, swimming, or biking  You will be more likely to keep weight off if you make these changes part of your lifestyle  Healthy meal plan for weight management:  A healthy meal plan includes a variety of foods, contains fewer calories, and helps you stay healthy  A healthy meal plan includes the following:  · Eat whole-grain foods more often  A healthy meal plan should contain fiber  Fiber is the part of grains, fruits, and vegetables that is not broken down by your body  Whole-grain foods are healthy and provide extra fiber in your diet   Some examples of whole-grain foods are whole-wheat breads and pastas, oatmeal, brown rice, and bulgur  · Eat a variety of vegetables every day  Include dark, leafy greens such as spinach, kale, jazmyne greens, and mustard greens  Eat yellow and orange vegetables such as carrots, sweet potatoes, and winter squash  · Eat a variety of fruits every day  Choose fresh or canned fruit (canned in its own juice or light syrup) instead of juice  Fruit juice has very little or no fiber  · Eat low-fat dairy foods  Drink fat-free (skim) milk or 1% milk  Eat fat-free yogurt and low-fat cottage cheese  Try low-fat cheeses such as mozzarella and other reduced-fat cheeses  · Choose meat and other protein foods that are low in fat  Choose beans or other legumes such as split peas or lentils  Choose fish, skinless poultry (chicken or turkey), or lean cuts of red meat (beef or pork)  Before you cook meat or poultry, cut off any visible fat  · Use less fat and oil  Try baking foods instead of frying them  Add less fat, such as margarine, sour cream, regular salad dressing and mayonnaise to foods  Eat fewer high-fat foods  Some examples of high-fat foods include french fries, doughnuts, ice cream, and cakes  · Eat fewer sweets  Limit foods and drinks that are high in sugar  This includes candy, cookies, regular soda, and sweetened drinks  Ways to decrease calories:   · Eat smaller portions  ¨ Use a small plate with smaller servings  ¨ Do not eat second helpings  ¨ When you eat at a restaurant, ask for a box and place half of your meal in the box before you eat  ¨ Share an entrée with someone else  · Replace high-calorie snacks with healthy, low-calorie snacks  ¨ Choose fresh fruit, vegetables, fat-free rice cakes, or air-popped popcorn instead of potato chips, nuts, or chocolate  ¨ Choose water or calorie-free drinks instead of soda or sweetened drinks  · Eat regular meals  Skipping meals can lead to overeating later in the day   Eat a healthy snack in place of a meal if you do not have time to eat a regular meal      · Do not shop for groceries when you are hungry  You may be more likely to make unhealthy food choices  Take a grocery list of healthy foods and shop after you have eaten  Exercise:  Exercise at least 30 minutes per day on most days of the week  Some examples of exercise include walking, biking, dancing, and swimming  You can also fit in more physical activity by taking the stairs instead of the elevator or parking farther away from stores  Ask your healthcare provider about the best exercise plan for you  Other things to consider as you try to lose weight:   · Be aware of situations that may give you the urge to overeat, such as eating while watching television  Find ways to avoid these situations  For example, read a book, go for a walk, or do crafts  · Meet with a weight loss support group or friends who are also trying to lose weight  This may help you stay motivated to continue working on your weight loss goals  © 2017 [employer]  Information is for End User's use only and may not be sold, redistributed or otherwise used for commercial purposes  All illustrations and images included in CareNotes® are the copyrighted property of [employer]  or [employer]  The above information is an  only  It is not intended as medical advice for individual conditions or treatments  Talk to your doctor, nurse or pharmacist before following any medical regimen to see if it is safe and effective for you  Obesity   AMBULATORY CARE:   Obesity  is when your body mass index (BMI) is greater than 30  Your healthcare provider will use your height and weight to measure your BMI  The risks of obesity include  many health problems, such as injuries or physical disability   You may need tests to check for the following:  · Diabetes     · High blood pressure or high cholesterol     · Heart disease · Gallbladder or liver disease     · Cancer of the colon, breast, prostate, liver, or kidney     · Sleep apnea     · Arthritis or gout  Seek care immediately if:   · You have a severe headache, confusion, or difficulty speaking  · You have weakness on one side of your body  · You have chest pain, sweating, or shortness of breath  Contact your healthcare provider if:   · You have symptoms of gallbladder or liver disease, such as pain in your upper abdomen  · You have knee or hip pain and discomfort while walking  · You have symptoms of diabetes, such as intense hunger and thirst, and frequent urination  · You have symptoms of sleep apnea, such as snoring or daytime sleepiness  · You have questions or concerns about your condition or care  Treatment for obesity  focuses on helping you lose weight to improve your health  Even a small decrease in BMI can reduce the risk for many health problems  Your healthcare provider will help you set a weight-loss goal   · Lifestyle changes  are the first step in treating obesity  These include making healthy food choices and getting regular physical activity  Your healthcare provider may suggest a weight-loss program that involves coaching, education, and therapy  · Medicine  may help you lose weight when it is used with a healthy diet and physical activity  · Surgery  can help you lose weight if you are very obese and have other health problems  There are several types of weight-loss surgery  Ask your healthcare provider for more information  Be successful losing weight:   · Set small, realistic goals  An example of a small goal is to walk for 20 minutes 5 days a week  Matt goal is to lose 5% of your body weight  · Tell friends, family members, and coworkers about your goals  and ask for their support  Ask a friend to lose weight with you, or join a weight-loss support group      · Identify foods or triggers that may cause you to overeat , and find ways to avoid them  Remove tempting high-calorie foods from your home and workplace  Place a bowl of fresh fruit on your kitchen counter  If stress causes you to eat, then find other ways to cope with stress  · Keep a diary to track what you eat and drink  Also write down how many minutes of physical activity you do each day  Weigh yourself once a week and record it in your diary  Eating changes: You will need to eat 500 to 1,000 fewer calories each day than you currently eat to lose 1 to 2 pounds a week  The following changes will help you cut calories:  · Eat smaller portions  Use small plates, no larger than 9 inches in diameter  Fill your plate half full of fruits and vegetables  Measure your food using measuring cups until you know what a serving size looks like  · Eat 3 meals and 1 or 2 snacks each day  Plan your meals in advance  Patrick Frank and eat at home most of the time  Eat slowly  · Eat fruits and vegetables at every meal   They are low in calories and high in fiber, which makes you feel full  Do not add butter, margarine, or cream sauce to vegetables  Use herbs to season steamed vegetables  · Eat less fat and fewer fried foods  Eat more baked or grilled chicken and fish  These protein sources are lower in calories and fat than red meat  Limit fast food  Dress your salads with olive oil and vinegar instead of bottled dressing  · Limit the amount of sugar you eat  Do not drink sugary beverages  Limit alcohol  Activity changes:  Physical activity is good for your body in many ways  It helps you burn calories and build strong muscles  It decreases stress and depression, and improves your mood  It can also help you sleep better  Talk to your healthcare provider before you begin an exercise program   · Exercise for at least 30 minutes 5 days a week  Start slowly  Set aside time each day for physical activity that you enjoy and that is convenient for you   It is best to do both weight training and an activity that increases your heart rate, such as walking, bicycling, or swimming  · Find ways to be more active  Do yard work and housecleaning  Walk up the stairs instead of using elevators  Spend your leisure time going to events that require walking, such as outdoor festivals or fairs  This extra physical activity can help you lose weight and keep it off  Follow up with your healthcare provider as directed: You may need to meet with a dietitian  Write down your questions so you remember to ask them during your visits  © 2017 [employer]  Information is for End User's use only and may not be sold, redistributed or otherwise used for commercial purposes  All illustrations and images included in CareNotes® are the copyrighted property of [employer]  or Tan Douglas  The above information is an  only  It is not intended as medical advice for individual conditions or treatments  Talk to your doctor, nurse or pharmacist before following any medical regimen to see if it is safe and effective for you  Cigarette Smoking and Your Health   AMBULATORY CARE:   Risks to your health if you smoke:  Nicotine and other chemicals found in tobacco damage every cell in your body  Even if you are a light smoker, you have an increased risk for cancer, heart disease, and lung disease  If you are pregnant or have diabetes, smoking increases your risk for complications  Benefits to your health if you stop smoking:   · You decrease respiratory symptoms such as coughing, wheezing, and shortness of breath  · You reduce your risk for cancers of the lung, mouth, throat, kidney, bladder, pancreas, stomach, and cervix  If you already have cancer, you increase the benefits of chemotherapy  You also reduce your risk for cancer returning or a second cancer from developing  · You reduce your risk for heart disease, blood clots, heart attack, and stroke  · You reduce your risk for lung infections, and diseases such as pneumonia, asthma, chronic bronchitis, and emphysema  · Your circulation improves  More oxygen can be delivered to your body  If you have diabetes, you lower your risk for complications, such as kidney, artery, and eye diseases  You also lower your risk for nerve damage  Nerve damage can lead to amputations, poor vision, and blindness  · You improve your body's ability to heal and to fight infections  Benefits to the health of others if you stop smoking:  Tobacco is harmful to nonsmokers who breathe in your secondhand smoke  The following are ways the health of others around you may improve when you stop smoking:  · You lower the risks for lung cancer and heart disease in nonsmoking adults  · If you are pregnant, you lower the risk for miscarriage, early delivery, low birth weight, and stillbirth  You also lower your baby's risk for SIDS, obesity, developmental delay, and neurobehavioral problems, such as ADHD  · If you have children, you lower their risk for ear infections, colds, pneumonia, bronchitis, and asthma  For more information and support to stop smoking:   · [employer]  Phone: 7- 299 - 768-9465  Web Address: www [employer]  Follow up with your healthcare provider as directed:  Write down your questions so you remember to ask them during your visits  © 2017 [employer] Information is for End User's use only and may not be sold, redistributed or otherwise used for commercial purposes  All illustrations and images included in CareNotes® are the copyrighted property of [employer]  or Tan Douglas  The above information is an  only  It is not intended as medical advice for individual conditions or treatments  Talk to your doctor, nurse or pharmacist before following any medical regimen to see if it is safe and effective for you      Obesity   AMBULATORY CARE: Obesity  is when your body mass index (BMI) is greater than 30  Your healthcare provider will use your height and weight to measure your BMI  The risks of obesity include  many health problems, such as injuries or physical disability  You may need tests to check for the following:  · Diabetes     · High blood pressure or high cholesterol     · Heart disease     · Gallbladder or liver disease     · Cancer of the colon, breast, prostate, liver, or kidney     · Sleep apnea     · Arthritis or gout  Seek care immediately if:   · You have a severe headache, confusion, or difficulty speaking  · You have weakness on one side of your body  · You have chest pain, sweating, or shortness of breath  Contact your healthcare provider if:   · You have symptoms of gallbladder or liver disease, such as pain in your upper abdomen  · You have knee or hip pain and discomfort while walking  · You have symptoms of diabetes, such as intense hunger and thirst, and frequent urination  · You have symptoms of sleep apnea, such as snoring or daytime sleepiness  · You have questions or concerns about your condition or care  Treatment for obesity  focuses on helping you lose weight to improve your health  Even a small decrease in BMI can reduce the risk for many health problems  Your healthcare provider will help you set a weight-loss goal   · Lifestyle changes  are the first step in treating obesity  These include making healthy food choices and getting regular physical activity  Your healthcare provider may suggest a weight-loss program that involves coaching, education, and therapy  · Medicine  may help you lose weight when it is used with a healthy diet and physical activity  · Surgery  can help you lose weight if you are very obese and have other health problems  There are several types of weight-loss surgery  Ask your healthcare provider for more information    Be successful losing weight:   · Set small, realistic goals  An example of a small goal is to walk for 20 minutes 5 days a week  Anther goal is to lose 5% of your body weight  · Tell friends, family members, and coworkers about your goals  and ask for their support  Ask a friend to lose weight with you, or join a weight-loss support group  · Identify foods or triggers that may cause you to overeat , and find ways to avoid them  Remove tempting high-calorie foods from your home and workplace  Place a bowl of fresh fruit on your kitchen counter  If stress causes you to eat, then find other ways to cope with stress  · Keep a diary to track what you eat and drink  Also write down how many minutes of physical activity you do each day  Weigh yourself once a week and record it in your diary  Eating changes: You will need to eat 500 to 1,000 fewer calories each day than you currently eat to lose 1 to 2 pounds a week  The following changes will help you cut calories:  · Eat smaller portions  Use small plates, no larger than 9 inches in diameter  Fill your plate half full of fruits and vegetables  Measure your food using measuring cups until you know what a serving size looks like  · Eat 3 meals and 1 or 2 snacks each day  Plan your meals in advance  Sheri Willis and eat at home most of the time  Eat slowly  · Eat fruits and vegetables at every meal   They are low in calories and high in fiber, which makes you feel full  Do not add butter, margarine, or cream sauce to vegetables  Use herbs to season steamed vegetables  · Eat less fat and fewer fried foods  Eat more baked or grilled chicken and fish  These protein sources are lower in calories and fat than red meat  Limit fast food  Dress your salads with olive oil and vinegar instead of bottled dressing  · Limit the amount of sugar you eat  Do not drink sugary beverages  Limit alcohol  Activity changes:  Physical activity is good for your body in many ways   It helps you burn calories and build strong muscles  It decreases stress and depression, and improves your mood  It can also help you sleep better  Talk to your healthcare provider before you begin an exercise program   · Exercise for at least 30 minutes 5 days a week  Start slowly  Set aside time each day for physical activity that you enjoy and that is convenient for you  It is best to do both weight training and an activity that increases your heart rate, such as walking, bicycling, or swimming  · Find ways to be more active  Do yard work and housecleaning  Walk up the stairs instead of using elevators  Spend your leisure time going to events that require walking, such as outdoor festivals or fairs  This extra physical activity can help you lose weight and keep it off  Follow up with your healthcare provider as directed: You may need to meet with a dietitian  Write down your questions so you remember to ask them during your visits  © 2017 [employer] Information is for End User's use only and may not be sold, redistributed or otherwise used for commercial purposes  All illustrations and images included in CareNotes® are the copyrighted property of [employer] , [employer]  or [employer]  The above information is an  only  It is not intended as medical advice for individual conditions or treatments  Talk to your doctor, nurse or pharmacist before following any medical regimen to see if it is safe and effective for you  Heart Healthy Diet   AMBULATORY CARE:   A heart healthy diet  is an eating plan low in total fat, unhealthy fats, and sodium (salt)  A heart healthy diet helps decrease your risk for heart disease and stroke  Limit the amount of fat you eat to 25% to 35% of your total daily calories  Limit sodium to less than 2,300 mg each day  Healthy fats:  Healthy fats can help improve cholesterol levels   The risk for heart disease is decreased when cholesterol levels are normal  Choose healthy fats, such as the following:  · Unsaturated fat  is found in foods such as soybean, canola, olive, corn, and safflower oils  It is also found in soft tub margarine that is made with liquid vegetable oil  · Omega-3 fat  is found in certain fish, such as salmon, tuna, and trout, and in walnuts and flaxseed  Unhealthy fats:  Unhealthy fats can cause unhealthy cholesterol levels in your blood and increase your risk of heart disease  Limit unhealthy fats, such as the following:  · Cholesterol  is found in animal foods, such as eggs and lobster, and in dairy products made from whole milk  Limit cholesterol to less than 300 milligrams (mg) each day  You may need to limit cholesterol to 200 mg each day if you have heart disease  · Saturated fat  is found in meats, such as guillory and hamburger  It is also found in chicken or turkey skin, whole milk, and butter  Limit saturated fat to less than 7% of your total daily calories  Limit saturated fat to less than 6% if you have heart disease or are at increased risk for it  · Trans fat  is found in packaged foods, such as potato chips and cookies  It is also in hard margarine, some fried foods, and shortening  Avoid trans fats as much as possible    Heart healthy foods and drinks to include:  Ask your dietitian or healthcare provider how many servings to have from each of the following food groups:  · Grains:      ¨ Whole-wheat breads, cereals, and pastas, and brown rice    ¨ Low-fat, low-sodium crackers and chips    · Vegetables:      ¨ Broccoli, green beans, green peas, and spinach    ¨ Collards, kale, and lima beans    ¨ Carrots, sweet potatoes, tomatoes, and peppers    ¨ Canned vegetables with no salt added    · Fruits:      ¨ Bananas, peaches, pears, and pineapple    ¨ Grapes, raisins, and dates    ¨ Oranges, tangerines, grapefruit, orange juice, and grapefruit juice    ¨ Apricots, mangoes, melons, and papaya    ¨ Raspberries and strawberries    ¨ Canned fruit with no added sugar    · Low-fat dairy products:      ¨ Nonfat (skim) milk, 1% milk, and low-fat almond, cashew, or soy milks fortified with calcium    ¨ Low-fat cheese, regular or frozen yogurt, and cottage cheese    · Meats and proteins , such as lean cuts of beef and pork (loin, leg, round), skinless chicken and turkey, legumes, soy products, egg whites, and nuts  Foods and drinks to limit or avoid:  Ask your dietitian or healthcare provider about these and other foods that are high in unhealthy fat, sodium, and sugar:  · Snack or packaged foods , such as frozen dinners, cookies, macaroni and cheese, and cereals with more than 300 mg of sodium per serving    · Canned or dry mixes  for cakes, soups, sauces, or gravies    · Vegetables with added sodium , such as instant potatoes, vegetables with added sauces, or regular canned vegetables    · Other foods high in sodium , such as ketchup, barbecue sauce, salad dressing, pickles, olives, soy sauce, and miso    · High-fat dairy foods  such as whole or 2% milk, cream cheese, or sour cream, and cheeses     · High-fat protein foods  such as high-fat cuts of beef (T-bone steaks, ribs), chicken or turkey with skin, and organ meats, such as liver    · Cured or smoked meats , such as hot dogs, guillory, and sausage    · Unhealthy fats and oils , such as butter, stick margarine, shortening, and cooking oils such as coconut or palm oil    · Food and drinks high in sugar , such as soft drinks (soda), sports drinks, sweetened tea, candy, cake, cookies, pies, and doughnuts  Other diet guidelines to follow:   · Eat more foods containing omega-3 fats  Eat fish high in omega-3 fats at least 2 times a week  · Limit alcohol  Too much alcohol can damage your heart and raise your blood pressure  Women should limit alcohol to 1 drink a day  Men should limit alcohol to 2 drinks a day  A drink of alcohol is 12 ounces of beer, 5 ounces of wine, or 1½ ounces of liquor      · Choose low-sodium foods  High-sodium foods can lead to high blood pressure  Add little or no salt to food you prepare  Use herbs and spices in place of salt  · Eat more fiber  to help lower cholesterol levels  Eat at least 5 servings of fruits and vegetables each day  Eat 3 ounces of whole-grain foods each day  Legumes (beans) are also a good source of fiber  Lifestyle guidelines:   · Do not smoke  Nicotine and other chemicals in cigarettes and cigars can cause lung and heart damage  Ask your healthcare provider for information if you currently smoke and need help to quit  E-cigarettes or smokeless tobacco still contain nicotine  Talk to your healthcare provider before you use these products  · Exercise regularly  to help you maintain a healthy weight and improve your blood pressure and cholesterol levels  Ask your healthcare provider about the best exercise plan for you  Do not start an exercise program without asking your healthcare provider  Follow up with your healthcare provider as directed:  Write down your questions so you remember to ask them during your visits  © 2017 [employer]  Information is for End User's use only and may not be sold, redistributed or otherwise used for commercial purposes  All illustrations and images included in CareNotes® are the copyrighted property of [employer]  or [employer]  The above information is an  only  It is not intended as medical advice for individual conditions or treatments  Talk to your doctor, nurse or pharmacist before following any medical regimen to see if it is safe and effective for you

## 2020-09-15 NOTE — PROGRESS NOTES
[facility]    NAME: Radha Rondon  AGE: 54 y o  SEX: male  : 1965     DATE: 9/15/2020     Assessment and Plan:     Problem List Items Addressed This Visit        Cardiovascular and Mediastinum    Hypertension      Other Visit Diagnoses     Annual physical exam    -  Primary    Screening for colorectal cancer            Negative ACS workup in hospital - has appointment with Cardiology on   Lab work from hospitalization on  appear normal   Patient refused flu vaccine  Immunizations and preventive care screenings were discussed with patient today  Appropriate education was printed on patient's after visit summary  Counseling:  Alcohol/drug use: discussed moderation in alcohol intake, the recommendations for healthy alcohol use, and avoidance of illicit drug use  Dental Health: discussed importance of regular tooth brushing, flossing, and dental visits  · Exercise: the importance of regular exercise/physical activity was discussed  Recommend exercise 3-5 times per week for at least 30 minutes  Follow up in 3 months  Chief Complaint:     Chief Complaint   Patient presents with    Establish Care     establish care , Hospital f/u for chest pain,needs refill,wants disabilty form filled  History of Present Illness:     Adult Annual Physical   Patient here for a comprehensive physical exam  The patient reports problems - chest pain when walking about 4 blocks or going up a flight of stairs  Patient first noticed this 4 years ago  It has gotten worse over the years  The pain is midsternal, dull, 5/10, and improves with relaxation and is worse with movement  Patient has shortness of breath with these pains  Patient also reports feeling dizzy with bending down with these episodes  Diet and Physical Activity  · Diet/Nutrition: frequent junk food and limited fruits/vegetables     · Exercise: no formal exercise  Depression Screening  PHQ-9 Depression Screening    PHQ-9:    Frequency of the following problems over the past two weeks:       Little interest or pleasure in doing things:  0 - not at all  Feeling down, depressed, or hopeless:  3 - nearly every day  Trouble falling or staying asleep, or sleeping too much:  3 - nearly every day  Feeling tired or having little energy:  3 - nearly every day  Poor appetite or overeating:  3 - nearly every day  Feeling bad about yourself - or that you are a failure or have let yourself or your family down:  3 - nearly every day  Trouble concentrating on things, such as reading the newspaper or watching television:  0 - not at all  Moving or speaking so slowly that other people could have noticed  Or the opposite - being so fidgety or restless that you have been moving around a lot more than usual:  2 - more than half the days  Thoughts that you would be better off dead, or of hurting yourself in some way:  0 - not at all  PHQ-2 Score:  3  PHQ-9 Score:  17       General Health  · Sleep: gets 4-6 hours of sleep on average  · Hearing: significantly decreased - left  · Vision: wears glasses  · Dental: no dental visits for >1 year   Health  · Symptoms include: urinary frequency and nocturia     Review of Systems:     Review of Systems   Constitutional: Negative for chills, fatigue and fever  HENT: Negative for ear pain, rhinorrhea, sore throat and tinnitus  Respiratory: Positive for cough, shortness of breath and wheezing  Cardiovascular: Positive for chest pain, palpitations and leg swelling  Gastrointestinal: Negative for abdominal pain, blood in stool, constipation, diarrhea, nausea and vomiting  Genitourinary: Negative for dysuria and hematuria  Musculoskeletal: Positive for arthralgias (in left knee and right shoulder), back pain (low), neck pain and neck stiffness  Skin: Negative for rash     Allergic/Immunologic: Negative for environmental allergies and food allergies  Neurological: Positive for weakness  Negative for dizziness, light-headedness, numbness and headaches  Psychiatric/Behavioral: Positive for sleep disturbance  Negative for confusion, self-injury and suicidal ideas  The patient is not nervous/anxious         Past Medical History:     Past Medical History:   Diagnosis Date    Concussion     5 total concussions    Depression     Hypertension     Psychiatric disorder     depression      Past Surgical History:     Past Surgical History:   Procedure Laterality Date    ABDOMINAL SURGERY      SPLENECTOMY, TOTAL      VASECTOMY        Family History:     Family History   Problem Relation Age of Onset    Breast cancer additional onset Mother    [place] Cancer Father     Heart disease Sister       Social History:        Social History     Socioeconomic History    Marital status:      Spouse name: None    Number of children: None    Years of education: None    Highest education level: None   Occupational History    None   Social Needs    Financial resource strain: None    Food insecurity     Worry: None     Inability: None    Transportation needs     Medical: None     Non-medical: None   Tobacco Use    Smoking status: Current Every Day Smoker     Packs/day: 1 00     Years: 30 00     Pack years: 30 00     Types: Cigarettes    Smokeless tobacco: Never Used   Substance and Sexual Activity    Alcohol use: Not Currently     Frequency: Never     Drinks per session: Patient refused     Binge frequency: Patient refused     Comment: Hx of ETOH abuse; has been sober for 8 years    Drug use: Yes     Frequency: 2 0 times per week     Types: Marijuana    Sexual activity: Not Currently   Lifestyle    Physical activity     Days per week: None     Minutes per session: None    Stress: None   Relationships    Social connections     Talks on phone: None     Gets together: None     Attends Muslim service: None     Active member of club or organization: None     Attends meetings of clubs or organizations: None     Relationship status: None    Intimate partner violence     Fear of current or ex partner: None     Emotionally abused: None     Physically abused: None     Forced sexual activity: None   Other Topics Concern    None   Social History Narrative    None      Current Medications:     Current Outpatient Medications   Medication Sig Dispense Refill    lisinopril-hydrochlorothiazide (PRINZIDE,ZESTORETIC) 20-25 MG per tablet Take 1 tablet by mouth daily 30 tablet 0     No current facility-administered medications for this visit  Allergies: Allergies   Allergen Reactions    Morphine And Related Vomiting      Physical Exam:     /82 (BP Location: Left arm, Patient Position: Sitting, Cuff Size: Extra-Large)   Pulse (!) 109   Temp 97 6 °F (36 4 °C) (Tympanic)   Resp 20   Ht 5' 11" (1 803 m)   Wt (!) 184 kg (405 lb)   SpO2 95%   BMI 56 49 kg/m²     Physical Exam  Constitutional:       Appearance: He is obese  HENT:      Head: Normocephalic and atraumatic  Mouth/Throat:      Mouth: Mucous membranes are moist       Pharynx: Oropharynx is clear  Eyes:      Pupils: Pupils are equal, round, and reactive to light  Neck:      Musculoskeletal: Neck rigidity and muscular tenderness present  Cardiovascular:      Rate and Rhythm: Normal rate and regular rhythm  Comments: Distant heart sounds  Pulmonary:      Effort: Pulmonary effort is normal       Breath sounds: Wheezing (on expiration) present  Abdominal:      Palpations: Abdomen is soft  There is no mass  Tenderness: There is no abdominal tenderness  There is no guarding  Comments: Surgical scar running vertically across the abdomen   Musculoskeletal:         General: Tenderness (in right shoulder and left knee) present  Skin:     General: Skin is warm and dry  Neurological:      General: No focal deficit present        Mental Status: He is alert and oriented to person, place, and time  Psychiatric:         Mood and Affect: Mood normal          Thought Content: Thought content normal           Felipa Diego MD  [facility]  BMI Counseling: Body mass index is 56 49 kg/m²  The BMI is above normal  Nutrition recommendations include reducing portion sizes, decreasing overall calorie intake, 3-5 servings of fruits/vegetables daily, reducing fast food intake and decreasing soda and/or juice intake  Exercise recommendations include exercising 3-5 times per week

## 2020-09-24 ENCOUNTER — PREP FOR PROCEDURE (OUTPATIENT)
Dept: GASTROENTEROLOGY | Facility: CLINIC | Age: 55
End: 2020-09-24

## 2020-09-24 DIAGNOSIS — Z12.11 SCREEN FOR COLON CANCER: Primary | ICD-10-CM

## 2020-10-06 ENCOUNTER — CONSULT (OUTPATIENT)
Dept: CARDIOLOGY CLINIC | Facility: CLINIC | Age: 55
End: 2020-10-06
Payer: COMMERCIAL

## 2020-10-06 VITALS
DIASTOLIC BLOOD PRESSURE: 80 MMHG | HEIGHT: 71 IN | SYSTOLIC BLOOD PRESSURE: 126 MMHG | HEART RATE: 88 BPM | BODY MASS INDEX: 44.1 KG/M2 | WEIGHT: 315 LBS | TEMPERATURE: 98.7 F | OXYGEN SATURATION: 94 %

## 2020-10-06 DIAGNOSIS — I35.0 AORTIC VALVE STENOSIS, ETIOLOGY OF CARDIAC VALVE DISEASE UNSPECIFIED: ICD-10-CM

## 2020-10-06 DIAGNOSIS — M25.562 CHRONIC PAIN OF LEFT KNEE: ICD-10-CM

## 2020-10-06 DIAGNOSIS — R07.89 OTHER CHEST PAIN: ICD-10-CM

## 2020-10-06 DIAGNOSIS — G89.29 CHRONIC PAIN OF LEFT KNEE: ICD-10-CM

## 2020-10-06 DIAGNOSIS — I10 ESSENTIAL HYPERTENSION: Primary | ICD-10-CM

## 2020-10-06 PROCEDURE — 99214 OFFICE O/P EST MOD 30 MIN: CPT | Performed by: INTERNAL MEDICINE

## 2020-10-13 ENCOUNTER — TELEPHONE (OUTPATIENT)
Dept: GASTROENTEROLOGY | Facility: AMBULARY SURGERY CENTER | Age: 55
End: 2020-10-13

## 2020-10-13 DIAGNOSIS — Z12.11 COLON CANCER SCREENING: Primary | ICD-10-CM

## 2020-10-14 ENCOUNTER — OFFICE VISIT (OUTPATIENT)
Dept: OBGYN CLINIC | Facility: CLINIC | Age: 55
End: 2020-10-14
Payer: COMMERCIAL

## 2020-10-14 ENCOUNTER — APPOINTMENT (OUTPATIENT)
Dept: RADIOLOGY | Facility: CLINIC | Age: 55
End: 2020-10-14
Payer: COMMERCIAL

## 2020-10-14 VITALS
HEART RATE: 88 BPM | DIASTOLIC BLOOD PRESSURE: 90 MMHG | BODY MASS INDEX: 44.1 KG/M2 | WEIGHT: 315 LBS | HEIGHT: 71 IN | SYSTOLIC BLOOD PRESSURE: 149 MMHG

## 2020-10-14 DIAGNOSIS — M17.12 PRIMARY OSTEOARTHRITIS OF LEFT KNEE: Primary | ICD-10-CM

## 2020-10-14 DIAGNOSIS — G89.29 CHRONIC PAIN OF LEFT KNEE: ICD-10-CM

## 2020-10-14 DIAGNOSIS — M25.562 CHRONIC PAIN OF LEFT KNEE: ICD-10-CM

## 2020-10-14 PROCEDURE — 20610 DRAIN/INJ JOINT/BURSA W/O US: CPT | Performed by: ORTHOPAEDIC SURGERY

## 2020-10-14 PROCEDURE — 3080F DIAST BP >= 90 MM HG: CPT | Performed by: ORTHOPAEDIC SURGERY

## 2020-10-14 PROCEDURE — 73562 X-RAY EXAM OF KNEE 3: CPT

## 2020-10-14 PROCEDURE — 99204 OFFICE O/P NEW MOD 45 MIN: CPT | Performed by: ORTHOPAEDIC SURGERY

## 2020-10-14 RX ORDER — LIDOCAINE HYDROCHLORIDE 10 MG/ML
4 INJECTION, SOLUTION INFILTRATION; PERINEURAL
Status: COMPLETED | OUTPATIENT
Start: 2020-10-14 | End: 2020-10-14

## 2020-10-14 RX ORDER — DEXAMETHASONE SODIUM PHOSPHATE 100 MG/10ML
40 INJECTION INTRAMUSCULAR; INTRAVENOUS
Status: COMPLETED | OUTPATIENT
Start: 2020-10-14 | End: 2020-10-14

## 2020-10-14 RX ADMIN — DEXAMETHASONE SODIUM PHOSPHATE 40 MG: 100 INJECTION INTRAMUSCULAR; INTRAVENOUS at 11:11

## 2020-10-14 RX ADMIN — LIDOCAINE HYDROCHLORIDE 4 ML: 10 INJECTION, SOLUTION INFILTRATION; PERINEURAL at 11:11

## 2020-10-15 DIAGNOSIS — M25.569 KNEE PAIN, UNSPECIFIED CHRONICITY, UNSPECIFIED LATERALITY: Primary | ICD-10-CM

## 2020-10-16 ENCOUNTER — TELEPHONE (OUTPATIENT)
Dept: FAMILY MEDICINE CLINIC | Facility: CLINIC | Age: 55
End: 2020-10-16

## 2020-10-17 ENCOUNTER — NURSE TRIAGE (OUTPATIENT)
Dept: OTHER | Facility: OTHER | Age: 55
End: 2020-10-17

## 2020-10-23 DIAGNOSIS — Z11.59 SCREENING FOR VIRAL DISEASE: ICD-10-CM

## 2020-10-23 PROCEDURE — U0003 INFECTIOUS AGENT DETECTION BY NUCLEIC ACID (DNA OR RNA); SEVERE ACUTE RESPIRATORY SYNDROME CORONAVIRUS 2 (SARS-COV-2) (CORONAVIRUS DISEASE [COVID-19]), AMPLIFIED PROBE TECHNIQUE, MAKING USE OF HIGH THROUGHPUT TECHNOLOGIES AS DESCRIBED BY CMS-2020-01-R: HCPCS | Performed by: INTERNAL MEDICINE

## 2020-10-25 LAB — SARS-COV-2 RNA SPEC QL NAA+PROBE: NOT DETECTED

## 2020-10-27 DIAGNOSIS — M25.569 KNEE PAIN, UNSPECIFIED CHRONICITY, UNSPECIFIED LATERALITY: ICD-10-CM

## 2020-10-29 ENCOUNTER — HOSPITAL ENCOUNTER (OUTPATIENT)
Dept: PERIOP | Facility: HOSPITAL | Age: 55
Setting detail: OUTPATIENT SURGERY
Discharge: HOME | End: 2020-10-29
Attending: INTERNAL MEDICINE

## 2020-10-29 VITALS
DIASTOLIC BLOOD PRESSURE: 80 MMHG | TEMPERATURE: 97.8 F | SYSTOLIC BLOOD PRESSURE: 130 MMHG | RESPIRATION RATE: 18 BRPM | WEIGHT: 315 LBS | OXYGEN SATURATION: 96 % | BODY MASS INDEX: 52.58 KG/M2 | HEART RATE: 88 BPM

## 2020-10-29 DIAGNOSIS — Z12.11 SCREEN FOR COLON CANCER: ICD-10-CM

## 2020-10-29 RX ORDER — SODIUM CHLORIDE, SODIUM LACTATE, POTASSIUM CHLORIDE, CALCIUM CHLORIDE 600; 310; 30; 20 MG/100ML; MG/100ML; MG/100ML; MG/100ML
125 INJECTION, SOLUTION INTRAVENOUS CONTINUOUS
Status: DISCONTINUED | OUTPATIENT
Start: 2020-10-29 | End: 2020-11-02 | Stop reason: HOSPADM

## 2020-11-04 ENCOUNTER — TELEPHONE (OUTPATIENT)
Dept: GASTROENTEROLOGY | Facility: CLINIC | Age: 55
End: 2020-11-04

## 2020-11-10 DIAGNOSIS — I10 ESSENTIAL HYPERTENSION: ICD-10-CM

## 2020-11-10 RX ORDER — LISINOPRIL AND HYDROCHLOROTHIAZIDE 20; 12.5 MG/1; MG/1
TABLET ORAL
Qty: 30 TABLET | Refills: 1 | OUTPATIENT
Start: 2020-11-10

## 2020-11-16 ENCOUNTER — EVALUATION (OUTPATIENT)
Dept: PHYSICAL THERAPY | Facility: CLINIC | Age: 55
End: 2020-11-16
Payer: COMMERCIAL

## 2020-11-16 VITALS — HEART RATE: 83 BPM | RESPIRATION RATE: 97 BRPM | SYSTOLIC BLOOD PRESSURE: 141 MMHG | DIASTOLIC BLOOD PRESSURE: 89 MMHG

## 2020-11-16 DIAGNOSIS — M17.12 PRIMARY OSTEOARTHRITIS OF LEFT KNEE: Primary | ICD-10-CM

## 2020-11-16 PROCEDURE — 97162 PT EVAL MOD COMPLEX 30 MIN: CPT

## 2020-11-18 ENCOUNTER — OFFICE VISIT (OUTPATIENT)
Dept: PHYSICAL THERAPY | Facility: CLINIC | Age: 55
End: 2020-11-18
Payer: COMMERCIAL

## 2020-11-18 DIAGNOSIS — M17.12 PRIMARY OSTEOARTHRITIS OF LEFT KNEE: Primary | ICD-10-CM

## 2020-11-18 PROCEDURE — 97110 THERAPEUTIC EXERCISES: CPT

## 2020-11-24 DIAGNOSIS — I10 ESSENTIAL HYPERTENSION: ICD-10-CM

## 2020-11-24 RX ORDER — LISINOPRIL AND HYDROCHLOROTHIAZIDE 20; 12.5 MG/1; MG/1
TABLET ORAL
Qty: 30 TABLET | Refills: 2 | OUTPATIENT
Start: 2020-11-24

## 2020-12-01 ENCOUNTER — OFFICE VISIT (OUTPATIENT)
Dept: PHYSICAL THERAPY | Facility: CLINIC | Age: 55
End: 2020-12-01
Payer: COMMERCIAL

## 2020-12-01 DIAGNOSIS — M17.12 PRIMARY OSTEOARTHRITIS OF LEFT KNEE: Primary | ICD-10-CM

## 2020-12-01 PROCEDURE — 97110 THERAPEUTIC EXERCISES: CPT

## 2020-12-03 ENCOUNTER — OFFICE VISIT (OUTPATIENT)
Dept: PHYSICAL THERAPY | Facility: CLINIC | Age: 55
End: 2020-12-03
Payer: COMMERCIAL

## 2020-12-03 DIAGNOSIS — M17.12 PRIMARY OSTEOARTHRITIS OF LEFT KNEE: Primary | ICD-10-CM

## 2020-12-03 PROCEDURE — 97110 THERAPEUTIC EXERCISES: CPT

## 2020-12-03 PROCEDURE — 97140 MANUAL THERAPY 1/> REGIONS: CPT

## 2020-12-04 ENCOUNTER — OFFICE VISIT (OUTPATIENT)
Dept: GASTROENTEROLOGY | Facility: CLINIC | Age: 55
End: 2020-12-04
Payer: COMMERCIAL

## 2020-12-04 ENCOUNTER — OFFICE VISIT (OUTPATIENT)
Dept: OBGYN CLINIC | Facility: CLINIC | Age: 55
End: 2020-12-04
Payer: COMMERCIAL

## 2020-12-04 VITALS
WEIGHT: 315 LBS | DIASTOLIC BLOOD PRESSURE: 85 MMHG | SYSTOLIC BLOOD PRESSURE: 136 MMHG | HEART RATE: 86 BPM | BODY MASS INDEX: 44.1 KG/M2 | HEIGHT: 71 IN

## 2020-12-04 VITALS — WEIGHT: 315 LBS | BODY MASS INDEX: 44.1 KG/M2 | TEMPERATURE: 97.1 F | HEIGHT: 71 IN

## 2020-12-04 DIAGNOSIS — Z12.11 COLON CANCER SCREENING: Primary | ICD-10-CM

## 2020-12-04 DIAGNOSIS — Z78.9 WEIGHT LOSS ADVISED: ICD-10-CM

## 2020-12-04 DIAGNOSIS — R13.14 PHARYNGOESOPHAGEAL DYSPHAGIA: ICD-10-CM

## 2020-12-04 DIAGNOSIS — M17.12 PRIMARY OSTEOARTHRITIS OF LEFT KNEE: Primary | ICD-10-CM

## 2020-12-04 DIAGNOSIS — E66.01 MORBID OBESITY WITH BMI OF 50.0-59.9, ADULT (HCC): ICD-10-CM

## 2020-12-04 DIAGNOSIS — R19.8 ALTERNATING CONSTIPATION AND DIARRHEA: ICD-10-CM

## 2020-12-04 PROCEDURE — 99214 OFFICE O/P EST MOD 30 MIN: CPT | Performed by: ORTHOPAEDIC SURGERY

## 2020-12-04 PROCEDURE — 99243 OFF/OP CNSLTJ NEW/EST LOW 30: CPT | Performed by: INTERNAL MEDICINE

## 2020-12-04 RX ORDER — IBUPROFEN 600 MG/1
600 TABLET ORAL EVERY 6 HOURS PRN
COMMUNITY
Start: 2020-11-10 | End: 2020-12-30 | Stop reason: HOSPADM

## 2020-12-04 RX ORDER — LISINOPRIL AND HYDROCHLOROTHIAZIDE 20; 12.5 MG/1; MG/1
1 TABLET ORAL DAILY
COMMUNITY
Start: 2020-11-10 | End: 2020-12-07

## 2020-12-07 ENCOUNTER — OFFICE VISIT (OUTPATIENT)
Dept: FAMILY MEDICINE CLINIC | Facility: CLINIC | Age: 55
End: 2020-12-07
Payer: COMMERCIAL

## 2020-12-07 VITALS
OXYGEN SATURATION: 97 % | HEIGHT: 72 IN | HEART RATE: 89 BPM | WEIGHT: 315 LBS | RESPIRATION RATE: 24 BRPM | TEMPERATURE: 99.2 F | DIASTOLIC BLOOD PRESSURE: 86 MMHG | BODY MASS INDEX: 42.66 KG/M2 | SYSTOLIC BLOOD PRESSURE: 124 MMHG

## 2020-12-07 DIAGNOSIS — G89.11 ACUTE SHOULDER PAIN DUE TO TRAUMA, RIGHT: Primary | ICD-10-CM

## 2020-12-07 DIAGNOSIS — M25.511 ACUTE SHOULDER PAIN DUE TO TRAUMA, RIGHT: Primary | ICD-10-CM

## 2020-12-07 DIAGNOSIS — I10 HYPERTENSION: ICD-10-CM

## 2020-12-07 DIAGNOSIS — M75.41 ROTATOR CUFF IMPINGEMENT SYNDROME OF RIGHT SHOULDER: ICD-10-CM

## 2020-12-07 PROCEDURE — 3008F BODY MASS INDEX DOCD: CPT | Performed by: FAMILY MEDICINE

## 2020-12-07 PROCEDURE — 99214 OFFICE O/P EST MOD 30 MIN: CPT | Performed by: FAMILY MEDICINE

## 2020-12-07 PROCEDURE — 3074F SYST BP LT 130 MM HG: CPT | Performed by: FAMILY MEDICINE

## 2020-12-07 PROCEDURE — 4004F PT TOBACCO SCREEN RCVD TLK: CPT | Performed by: FAMILY MEDICINE

## 2020-12-07 PROCEDURE — 3079F DIAST BP 80-89 MM HG: CPT | Performed by: FAMILY MEDICINE

## 2020-12-07 RX ORDER — LISINOPRIL AND HYDROCHLOROTHIAZIDE 25; 20 MG/1; MG/1
1 TABLET ORAL DAILY
Qty: 90 TABLET | Refills: 1 | Status: SHIPPED | OUTPATIENT
Start: 2020-12-07 | End: 2021-05-18

## 2020-12-08 ENCOUNTER — TRANSCRIBE ORDERS (OUTPATIENT)
Dept: ADMINISTRATIVE | Facility: HOSPITAL | Age: 55
End: 2020-12-08

## 2020-12-08 ENCOUNTER — HOSPITAL ENCOUNTER (OUTPATIENT)
Dept: RADIOLOGY | Facility: HOSPITAL | Age: 55
Discharge: HOME | End: 2020-12-08
Payer: COMMERCIAL

## 2020-12-08 ENCOUNTER — OFFICE VISIT (OUTPATIENT)
Dept: PHYSICAL THERAPY | Facility: CLINIC | Age: 55
End: 2020-12-08
Payer: COMMERCIAL

## 2020-12-08 DIAGNOSIS — M25.511 ACUTE SHOULDER PAIN DUE TO TRAUMA, RIGHT: ICD-10-CM

## 2020-12-08 DIAGNOSIS — M25.569 KNEE PAIN, UNSPECIFIED CHRONICITY, UNSPECIFIED LATERALITY: ICD-10-CM

## 2020-12-08 DIAGNOSIS — G89.11 ACUTE SHOULDER PAIN DUE TO TRAUMA, RIGHT: ICD-10-CM

## 2020-12-08 DIAGNOSIS — M17.12 PRIMARY OSTEOARTHRITIS OF LEFT KNEE: Primary | ICD-10-CM

## 2020-12-08 PROCEDURE — 73030 X-RAY EXAM OF SHOULDER: CPT

## 2020-12-08 PROCEDURE — 97110 THERAPEUTIC EXERCISES: CPT

## 2020-12-10 ENCOUNTER — OFFICE VISIT (OUTPATIENT)
Dept: PHYSICAL THERAPY | Facility: CLINIC | Age: 55
End: 2020-12-10
Payer: COMMERCIAL

## 2020-12-10 DIAGNOSIS — M17.12 PRIMARY OSTEOARTHRITIS OF LEFT KNEE: Primary | ICD-10-CM

## 2020-12-10 PROCEDURE — 97110 THERAPEUTIC EXERCISES: CPT

## 2020-12-10 PROCEDURE — 97140 MANUAL THERAPY 1/> REGIONS: CPT

## 2020-12-15 ENCOUNTER — EVALUATION (OUTPATIENT)
Dept: PHYSICAL THERAPY | Facility: CLINIC | Age: 55
End: 2020-12-15
Payer: COMMERCIAL

## 2020-12-15 DIAGNOSIS — M17.12 PRIMARY OSTEOARTHRITIS OF LEFT KNEE: Primary | ICD-10-CM

## 2020-12-15 PROCEDURE — 97530 THERAPEUTIC ACTIVITIES: CPT

## 2020-12-15 PROCEDURE — 97110 THERAPEUTIC EXERCISES: CPT

## 2020-12-17 ENCOUNTER — APPOINTMENT (OUTPATIENT)
Dept: PHYSICAL THERAPY | Facility: CLINIC | Age: 55
End: 2020-12-17
Payer: COMMERCIAL

## 2020-12-22 ENCOUNTER — OFFICE VISIT (OUTPATIENT)
Dept: PHYSICAL THERAPY | Facility: CLINIC | Age: 55
End: 2020-12-22
Payer: COMMERCIAL

## 2020-12-22 VITALS — HEIGHT: 72 IN | BODY MASS INDEX: 42.66 KG/M2 | WEIGHT: 315 LBS

## 2020-12-22 DIAGNOSIS — M17.12 PRIMARY OSTEOARTHRITIS OF LEFT KNEE: Primary | ICD-10-CM

## 2020-12-22 PROCEDURE — 97110 THERAPEUTIC EXERCISES: CPT

## 2020-12-22 NOTE — PRE-PROCEDURE INSTRUCTIONS
Pre-Surgery Instructions:   Medication Instructions    bisacodyl (DULCOLAX) 5 mg EC tablet Patient was instructed by Physician and understands   Diclofenac Sodium (VOLTAREN) 1 % Instructed patient per Anesthesia Guidelines   ibuprofen (MOTRIN) 600 mg tablet Instructed patient per Anesthesia Guidelines   lisinopril-hydrochlorothiazide (PRINZIDE,ZESTORETIC) 20-25 MG per tablet Pt to take the am of the procedures    polyethylene glycol (GOLYTELY) 4000 mL solution Patient was instructed by Physician and understands  Pt to follow Dr Nirav Forman instructions  Pt to have the covid test on 12/24/2020 at the [address] [place] [address]  Pt to take lisinopril/HCTZ the am of the procedure    sister Aquiles Louis

## 2020-12-24 DIAGNOSIS — Z11.59 SCREENING FOR VIRAL DISEASE: ICD-10-CM

## 2020-12-24 PROCEDURE — U0003 INFECTIOUS AGENT DETECTION BY NUCLEIC ACID (DNA OR RNA); SEVERE ACUTE RESPIRATORY SYNDROME CORONAVIRUS 2 (SARS-COV-2) (CORONAVIRUS DISEASE [COVID-19]), AMPLIFIED PROBE TECHNIQUE, MAKING USE OF HIGH THROUGHPUT TECHNOLOGIES AS DESCRIBED BY CMS-2020-01-R: HCPCS | Performed by: INTERNAL MEDICINE

## 2020-12-27 LAB — SARS-COV-2 RNA SPEC QL NAA+PROBE: NOT DETECTED

## 2020-12-28 DIAGNOSIS — Z12.11 COLON CANCER SCREENING: Primary | ICD-10-CM

## 2020-12-28 RX ORDER — BISACODYL 5 MG
TABLET, DELAYED RELEASE (ENTERIC COATED) ORAL
Qty: 2 TABLET | Refills: 0 | Status: ON HOLD | OUTPATIENT
Start: 2020-12-28 | End: 2021-04-09 | Stop reason: ALTCHOICE

## 2020-12-29 ENCOUNTER — ANESTHESIA EVENT (OUTPATIENT)
Dept: GASTROENTEROLOGY | Facility: AMBULARY SURGERY CENTER | Age: 55
End: 2020-12-29

## 2020-12-30 ENCOUNTER — TELEPHONE (OUTPATIENT)
Dept: OBGYN CLINIC | Facility: CLINIC | Age: 55
End: 2020-12-30

## 2020-12-30 ENCOUNTER — HOSPITAL ENCOUNTER (OUTPATIENT)
Dept: PERIOP | Facility: HOSPITAL | Age: 55
Setting detail: OUTPATIENT SURGERY
Discharge: HOME | End: 2020-12-30
Attending: INTERNAL MEDICINE
Payer: COMMERCIAL

## 2020-12-30 ENCOUNTER — ANESTHESIA (OUTPATIENT)
Dept: GASTROENTEROLOGY | Facility: AMBULARY SURGERY CENTER | Age: 55
End: 2020-12-30
Payer: COMMERCIAL

## 2020-12-30 ENCOUNTER — HOSPITAL ENCOUNTER (OUTPATIENT)
Dept: PERIOP | Facility: HOSPITAL | Age: 55
Setting detail: OUTPATIENT SURGERY
Discharge: HOME | End: 2020-12-30
Attending: INTERNAL MEDICINE

## 2020-12-30 VITALS
RESPIRATION RATE: 20 BRPM | HEIGHT: 72 IN | SYSTOLIC BLOOD PRESSURE: 169 MMHG | HEART RATE: 68 BPM | WEIGHT: 315 LBS | TEMPERATURE: 95.8 F | DIASTOLIC BLOOD PRESSURE: 76 MMHG | OXYGEN SATURATION: 98 % | BODY MASS INDEX: 42.66 KG/M2

## 2020-12-30 VITALS — HEART RATE: 100 BPM

## 2020-12-30 DIAGNOSIS — R13.14 PHARYNGOESOPHAGEAL DYSPHAGIA: ICD-10-CM

## 2020-12-30 DIAGNOSIS — R19.8 ALTERNATING CONSTIPATION AND DIARRHEA: ICD-10-CM

## 2020-12-30 DIAGNOSIS — Z12.11 COLON CANCER SCREENING: ICD-10-CM

## 2020-12-30 DIAGNOSIS — K29.80 DUODENITIS: Primary | ICD-10-CM

## 2020-12-30 PROBLEM — E66.01 CLASS 3 SEVERE OBESITY IN ADULT (HCC): Status: ACTIVE | Noted: 2020-12-30

## 2020-12-30 PROBLEM — E66.813 CLASS 3 SEVERE OBESITY IN ADULT (HCC): Status: ACTIVE | Noted: 2020-12-30

## 2020-12-30 PROBLEM — G47.30 SLEEP APNEA: Status: ACTIVE | Noted: 2020-12-30

## 2020-12-30 PROCEDURE — 43239 EGD BIOPSY SINGLE/MULTIPLE: CPT | Performed by: INTERNAL MEDICINE

## 2020-12-30 PROCEDURE — 88305 TISSUE EXAM BY PATHOLOGIST: CPT | Performed by: PATHOLOGY

## 2020-12-30 PROCEDURE — 45385 COLONOSCOPY W/LESION REMOVAL: CPT | Performed by: INTERNAL MEDICINE

## 2020-12-30 PROCEDURE — 45380 COLONOSCOPY AND BIOPSY: CPT | Performed by: INTERNAL MEDICINE

## 2020-12-30 RX ORDER — GLYCOPYRROLATE 0.2 MG/ML
INJECTION INTRAMUSCULAR; INTRAVENOUS AS NEEDED
Status: DISCONTINUED | OUTPATIENT
Start: 2020-12-30 | End: 2020-12-30

## 2020-12-30 RX ORDER — PANTOPRAZOLE SODIUM 40 MG/1
40 TABLET, DELAYED RELEASE ORAL DAILY
Qty: 30 TABLET | Refills: 1 | Status: SHIPPED | OUTPATIENT
Start: 2020-12-30 | End: 2021-02-04

## 2020-12-30 RX ORDER — SODIUM CHLORIDE 9 MG/ML
INJECTION, SOLUTION INTRAVENOUS CONTINUOUS PRN
Status: DISCONTINUED | OUTPATIENT
Start: 2020-12-30 | End: 2020-12-30

## 2020-12-30 RX ORDER — ONDANSETRON 2 MG/ML
4 INJECTION INTRAMUSCULAR; INTRAVENOUS ONCE AS NEEDED
Status: DISCONTINUED | OUTPATIENT
Start: 2020-12-30 | End: 2021-01-03 | Stop reason: HOSPADM

## 2020-12-30 RX ORDER — PROPOFOL 10 MG/ML
INJECTION, EMULSION INTRAVENOUS AS NEEDED
Status: DISCONTINUED | OUTPATIENT
Start: 2020-12-30 | End: 2020-12-30

## 2020-12-30 RX ORDER — LIDOCAINE HYDROCHLORIDE 20 MG/ML
INJECTION, SOLUTION INFILTRATION; PERINEURAL AS NEEDED
Status: DISCONTINUED | OUTPATIENT
Start: 2020-12-30 | End: 2020-12-30

## 2020-12-30 RX ADMIN — PROPOFOL 50 MG: 10 INJECTION, EMULSION INTRAVENOUS at 10:29

## 2020-12-30 RX ADMIN — PROPOFOL 50 MG: 10 INJECTION, EMULSION INTRAVENOUS at 09:44

## 2020-12-30 RX ADMIN — PROPOFOL 50 MG: 10 INJECTION, EMULSION INTRAVENOUS at 10:44

## 2020-12-30 RX ADMIN — PROPOFOL 50 MG: 10 INJECTION, EMULSION INTRAVENOUS at 10:24

## 2020-12-30 RX ADMIN — PROPOFOL 50 MG: 10 INJECTION, EMULSION INTRAVENOUS at 09:59

## 2020-12-30 RX ADMIN — LIDOCAINE HYDROCHLORIDE 6 ML: 20 INJECTION, SOLUTION INFILTRATION; PERINEURAL at 09:43

## 2020-12-30 RX ADMIN — PROPOFOL 50 MG: 10 INJECTION, EMULSION INTRAVENOUS at 10:04

## 2020-12-30 RX ADMIN — PROPOFOL 50 MG: 10 INJECTION, EMULSION INTRAVENOUS at 10:34

## 2020-12-30 RX ADMIN — PROPOFOL 100 MG: 10 INJECTION, EMULSION INTRAVENOUS at 09:43

## 2020-12-30 RX ADMIN — PROPOFOL 50 MG: 10 INJECTION, EMULSION INTRAVENOUS at 10:08

## 2020-12-30 RX ADMIN — PROPOFOL 50 MG: 10 INJECTION, EMULSION INTRAVENOUS at 10:13

## 2020-12-30 RX ADMIN — GLYCOPYRROLATE 0.2 MG: 0.2 INJECTION, SOLUTION INTRAMUSCULAR; INTRAVENOUS at 09:43

## 2020-12-30 RX ADMIN — PROPOFOL 10 MG: 10 INJECTION, EMULSION INTRAVENOUS at 10:45

## 2020-12-30 RX ADMIN — PROPOFOL 50 MG: 10 INJECTION, EMULSION INTRAVENOUS at 09:48

## 2020-12-30 RX ADMIN — PROPOFOL 50 MG: 10 INJECTION, EMULSION INTRAVENOUS at 09:45

## 2020-12-30 RX ADMIN — PROPOFOL 50 MG: 10 INJECTION, EMULSION INTRAVENOUS at 10:39

## 2020-12-30 RX ADMIN — PROPOFOL 50 MG: 10 INJECTION, EMULSION INTRAVENOUS at 09:52

## 2020-12-30 RX ADMIN — SODIUM CHLORIDE: 0.9 INJECTION, SOLUTION INTRAVENOUS at 09:38

## 2020-12-30 RX ADMIN — PROPOFOL 50 MG: 10 INJECTION, EMULSION INTRAVENOUS at 10:18
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
## 2021-01-05 ENCOUNTER — TELEPHONE (OUTPATIENT)
Dept: GASTROENTEROLOGY | Facility: AMBULARY SURGERY CENTER | Age: 56
End: 2021-01-05

## 2021-01-05 NOTE — TELEPHONE ENCOUNTER
----- Message from [facility], MD sent at 1/4/2021 11:56 AM EST -----  Please schedule patient follow-up in GI clinic in 3 months      Thank you

## 2021-01-11 ENCOUNTER — OFFICE VISIT (OUTPATIENT)
Dept: CARDIOLOGY CLINIC | Facility: CLINIC | Age: 56
End: 2021-01-11
Payer: COMMERCIAL

## 2021-01-11 VITALS
BODY MASS INDEX: 42.66 KG/M2 | DIASTOLIC BLOOD PRESSURE: 80 MMHG | SYSTOLIC BLOOD PRESSURE: 128 MMHG | OXYGEN SATURATION: 97 % | RESPIRATION RATE: 18 BRPM | HEIGHT: 72 IN | TEMPERATURE: 98.6 F | WEIGHT: 315 LBS | HEART RATE: 94 BPM

## 2021-01-11 DIAGNOSIS — I35.0 AORTIC VALVE STENOSIS, ETIOLOGY OF CARDIAC VALVE DISEASE UNSPECIFIED: Primary | ICD-10-CM

## 2021-01-11 DIAGNOSIS — I10 ESSENTIAL HYPERTENSION: ICD-10-CM

## 2021-01-11 DIAGNOSIS — R00.2 PALPITATIONS: ICD-10-CM

## 2021-01-11 DIAGNOSIS — E66.01 CLASS 3 SEVERE OBESITY WITHOUT SERIOUS COMORBIDITY WITH BODY MASS INDEX (BMI) OF 45.0 TO 49.9 IN ADULT, UNSPECIFIED OBESITY TYPE (HCC): ICD-10-CM

## 2021-01-11 PROCEDURE — 93000 ELECTROCARDIOGRAM COMPLETE: CPT | Performed by: INTERNAL MEDICINE

## 2021-01-11 PROCEDURE — 99214 OFFICE O/P EST MOD 30 MIN: CPT | Performed by: INTERNAL MEDICINE
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
## 2021-01-13 ENCOUNTER — HOSPITAL ENCOUNTER (OUTPATIENT)
Dept: RADIOLOGY | Facility: HOSPITAL | Age: 56
Discharge: HOME | End: 2021-01-13
Attending: INTERNAL MEDICINE
Payer: COMMERCIAL

## 2021-01-13 DIAGNOSIS — R13.14 PHARYNGOESOPHAGEAL DYSPHAGIA: ICD-10-CM

## 2021-01-13 PROCEDURE — 74230 X-RAY XM SWLNG FUNCJ C+: CPT

## 2021-01-13 PROCEDURE — 92611 MOTION FLUOROSCOPY/SWALLOW: CPT
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
## 2021-01-20 ENCOUNTER — OFFICE VISIT (OUTPATIENT)
Dept: OBGYN CLINIC | Facility: CLINIC | Age: 56
End: 2021-01-20
Payer: COMMERCIAL

## 2021-01-20 DIAGNOSIS — M17.12 PRIMARY OSTEOARTHRITIS OF LEFT KNEE: Primary | ICD-10-CM

## 2021-01-20 PROCEDURE — 20610 DRAIN/INJ JOINT/BURSA W/O US: CPT | Performed by: ORTHOPAEDIC SURGERY

## 2021-01-20 RX ORDER — HYALURONATE SODIUM 10 MG/ML
20 SYRINGE (ML) INTRAARTICULAR
Status: COMPLETED | OUTPATIENT
Start: 2021-01-20 | End: 2021-01-20

## 2021-01-20 RX ADMIN — Medication 20 MG: at 09:42

## 2021-01-25 ENCOUNTER — OFFICE VISIT (OUTPATIENT)
Dept: FAMILY MEDICINE CLINIC | Facility: CLINIC | Age: 56
End: 2021-01-25
Payer: COMMERCIAL

## 2021-01-25 VITALS
HEIGHT: 72 IN | HEART RATE: 103 BPM | TEMPERATURE: 97.4 F | BODY MASS INDEX: 42.66 KG/M2 | DIASTOLIC BLOOD PRESSURE: 80 MMHG | OXYGEN SATURATION: 95 % | SYSTOLIC BLOOD PRESSURE: 128 MMHG | WEIGHT: 315 LBS

## 2021-01-25 DIAGNOSIS — M54.12 CERVICAL RADICULOPATHY: Primary | ICD-10-CM

## 2021-01-25 DIAGNOSIS — G54.0 BRACHIAL PLEXITIS: ICD-10-CM

## 2021-01-25 DIAGNOSIS — L98.9 SKIN LESION OF BACK: ICD-10-CM

## 2021-01-25 PROCEDURE — 3074F SYST BP LT 130 MM HG: CPT | Performed by: FAMILY MEDICINE

## 2021-01-25 PROCEDURE — 3008F BODY MASS INDEX DOCD: CPT | Performed by: FAMILY MEDICINE

## 2021-01-25 PROCEDURE — 99213 OFFICE O/P EST LOW 20 MIN: CPT | Performed by: FAMILY MEDICINE

## 2021-01-25 PROCEDURE — 3079F DIAST BP 80-89 MM HG: CPT | Performed by: FAMILY MEDICINE

## 2021-01-25 PROCEDURE — 4004F PT TOBACCO SCREEN RCVD TLK: CPT | Performed by: FAMILY MEDICINE

## 2021-01-25 NOTE — PROGRESS NOTES
Assessment/Plan:      Diagnoses and all orders for this visit:    Cervical radiculopathy  -     Ambulatory referral to Neurology; Future    Skin lesion of back  -     Ambulatory referral to Dermatology; Future    Brachial plexitis  -     Ambulatory referral to Neurology; Future        Referral to Neurology for possible cervical radiculopathy versus brachial plexus neuritis  Pain begins in right lateral chest wall and radiates up to the axilla and shoulder, with residual paresthesias going down the arm to the fingertips  No reproducible findings on exam today  Suspicious lesion on right upper back, referral to Dermatology for possible biopsy  Subjective:     Patient ID: Nathan Nash is a 54 y o  male  Here with her episodes of pain in his right side that radiates up to his axilla and shoulder  Has associated pins and needles down his arm to his finger tips  This first episode was on the afternoon of 1/22  Pressure-like in nature  5 episodes in total  Pain was a 10/10 pain during most of these episodes  Associated right-sided neck pain  Denies fever, chills, nausea, vomiting, diarrhea, or constipation  Review of Systems   All other systems reviewed and are negative  Objective:     Physical Exam  Constitutional:       Appearance: Normal appearance  He is obese  Eyes:      Conjunctiva/sclera: Conjunctivae normal       Pupils: Pupils are equal, round, and reactive to light  Cardiovascular:      Rate and Rhythm: Normal rate and regular rhythm  Pulmonary:      Effort: Pulmonary effort is normal       Breath sounds: Normal breath sounds  Abdominal:      General: Bowel sounds are normal  There is no distension  Palpations: Abdomen is soft  There is no mass  Tenderness: There is no abdominal tenderness  Hernia: No hernia is present  Skin:     Findings: Lesion (on right upper back, varying degrees of purple coloration, raised, nonpainful) present     Neurological:      Mental Status: He is alert and oriented to person, place, and time  Psychiatric:         Mood and Affect: Mood normal          Thought Content:  Thought content normal

## 2021-01-27 ENCOUNTER — OFFICE VISIT (OUTPATIENT)
Dept: OBGYN CLINIC | Facility: CLINIC | Age: 56
End: 2021-01-27
Payer: COMMERCIAL

## 2021-01-27 DIAGNOSIS — M17.12 PRIMARY OSTEOARTHRITIS OF LEFT KNEE: Primary | ICD-10-CM

## 2021-01-27 DIAGNOSIS — M25.569 KNEE PAIN, UNSPECIFIED CHRONICITY, UNSPECIFIED LATERALITY: ICD-10-CM

## 2021-01-27 DIAGNOSIS — K29.80 DUODENITIS: ICD-10-CM

## 2021-01-27 PROCEDURE — 20610 DRAIN/INJ JOINT/BURSA W/O US: CPT | Performed by: ORTHOPAEDIC SURGERY

## 2021-01-27 RX ORDER — HYALURONATE SODIUM 10 MG/ML
20 SYRINGE (ML) INTRAARTICULAR
Status: COMPLETED | OUTPATIENT
Start: 2021-01-27 | End: 2021-01-27

## 2021-01-27 RX ADMIN — Medication 20 MG: at 09:59
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
## 2021-02-03 ENCOUNTER — OFFICE VISIT (OUTPATIENT)
Dept: OBGYN CLINIC | Facility: CLINIC | Age: 56
End: 2021-02-03
Payer: COMMERCIAL

## 2021-02-03 ENCOUNTER — HOSPITAL ENCOUNTER (OUTPATIENT)
Dept: NON INVASIVE DIAGNOSTICS | Facility: HOSPITAL | Age: 56
Discharge: HOME | End: 2021-02-03
Attending: INTERNAL MEDICINE
Payer: COMMERCIAL

## 2021-02-03 DIAGNOSIS — R00.2 PALPITATIONS: ICD-10-CM

## 2021-02-03 DIAGNOSIS — M17.12 PRIMARY OSTEOARTHRITIS OF LEFT KNEE: Primary | ICD-10-CM

## 2021-02-03 PROCEDURE — 20610 DRAIN/INJ JOINT/BURSA W/O US: CPT | Performed by: ORTHOPAEDIC SURGERY

## 2021-02-03 PROCEDURE — 93226 XTRNL ECG REC<48 HR SCAN A/R: CPT

## 2021-02-03 PROCEDURE — 93225 XTRNL ECG REC<48 HRS REC: CPT

## 2021-02-03 RX ORDER — HYALURONATE SODIUM 10 MG/ML
20 SYRINGE (ML) INTRAARTICULAR
Status: COMPLETED | OUTPATIENT
Start: 2021-02-03 | End: 2021-02-03

## 2021-02-03 RX ADMIN — Medication 20 MG: at 10:45
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
## 2021-02-04 ENCOUNTER — TELEPHONE (OUTPATIENT)
Dept: DERMATOLOGY | Facility: CLINIC | Age: 56
End: 2021-02-04

## 2021-02-04 RX ORDER — PANTOPRAZOLE SODIUM 40 MG/1
40 TABLET, DELAYED RELEASE ORAL DAILY
Qty: 30 TABLET | Refills: 1 | Status: SHIPPED | OUTPATIENT
Start: 2021-02-04 | End: 2021-03-24

## 2021-02-04 NOTE — TELEPHONE ENCOUNTER
Attempting to reschedule appointment  Patient states he has a difficult time finding transportation to the appointment  Patient does not want to schedule an appointment at this time and will call back when he is willing to reschedule

## 2021-02-12 PROCEDURE — 93227 XTRNL ECG REC<48 HR R&I: CPT | Performed by: INTERNAL MEDICINE

## 2021-02-25 ENCOUNTER — OFFICE VISIT (OUTPATIENT)
Dept: GASTROENTEROLOGY | Facility: CLINIC | Age: 56
End: 2021-02-25
Payer: COMMERCIAL

## 2021-02-25 VITALS
SYSTOLIC BLOOD PRESSURE: 136 MMHG | HEART RATE: 89 BPM | DIASTOLIC BLOOD PRESSURE: 98 MMHG | HEIGHT: 72 IN | BODY MASS INDEX: 42.66 KG/M2 | TEMPERATURE: 97.9 F | WEIGHT: 315 LBS

## 2021-02-25 DIAGNOSIS — R13.12 OROPHARYNGEAL DYSPHAGIA: ICD-10-CM

## 2021-02-25 DIAGNOSIS — Z86.010 HISTORY OF COLON POLYPS: ICD-10-CM

## 2021-02-25 DIAGNOSIS — Z72.0 TOBACCO USE: ICD-10-CM

## 2021-02-25 DIAGNOSIS — K21.9 GASTROESOPHAGEAL REFLUX DISEASE WITHOUT ESOPHAGITIS: ICD-10-CM

## 2021-02-25 DIAGNOSIS — E66.01 CLASS 3 SEVERE OBESITY DUE TO EXCESS CALORIES WITH SERIOUS COMORBIDITY AND BODY MASS INDEX (BMI) OF 45.0 TO 49.9 IN ADULT (HCC): ICD-10-CM

## 2021-02-25 DIAGNOSIS — K59.00 CONSTIPATION, UNSPECIFIED CONSTIPATION TYPE: Primary | ICD-10-CM

## 2021-02-25 PROCEDURE — 99214 OFFICE O/P EST MOD 30 MIN: CPT | Performed by: INTERNAL MEDICINE
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
## 2021-02-25 NOTE — PATIENT INSTRUCTIONS
Constipation  - increase dietary fiber (fruits and veggies), increase hydration with water and increase activity/exercise  - start fiber supplement (metamucil, citrucel, or benefiber) daily (over the counter, generic)  - start miralax 1 capful (17 g) daily as needed (over the counter, generic)    Heartburn and acid reflux  - continue pantoprazole 40 mg daily (wait 30 minutes to eat after taking)  Consider decreasing dose in future  - lifestyle changes to reduce acid reflux: cut down on caffeine (coffee, tea, soda, chocolate), peppermint, spicy/greasy foods, trigger foods (citrus, red sauces); avoid laying down for 2-3 hours after meals; elevate the head of your bed; avoid tight clothing around abdomen; stop smoking and lose weight    Tobacco use  - cut back and quit    Weight loss  - discussed lifestyle changes to reduce weight: decreasing carbohydrates/starchy foods, decreasing sugary drinks, limiting portion, and eating more fruits and veggies; encourage 30 minutes of exercise 5-6x per week  - good job losing weight  Keep it up!    - discuss sleep study with primary care doctor       Follow up in 6 months with PA

## 2021-03-03 ENCOUNTER — OFFICE VISIT (OUTPATIENT)
Dept: DERMATOLOGY | Age: 56
End: 2021-03-03
Payer: COMMERCIAL

## 2021-03-03 VITALS — TEMPERATURE: 98.9 F | BODY MASS INDEX: 42.66 KG/M2 | HEIGHT: 72 IN | WEIGHT: 315 LBS

## 2021-03-03 DIAGNOSIS — D22.60 MULTIPLE BENIGN MELANOCYTIC NEVI OF UPPER EXTREMITY, LOWER EXTREMITY, AND TRUNK: ICD-10-CM

## 2021-03-03 DIAGNOSIS — D22.70 MULTIPLE BENIGN MELANOCYTIC NEVI OF UPPER EXTREMITY, LOWER EXTREMITY, AND TRUNK: ICD-10-CM

## 2021-03-03 DIAGNOSIS — D22.5 MULTIPLE BENIGN MELANOCYTIC NEVI OF UPPER EXTREMITY, LOWER EXTREMITY, AND TRUNK: ICD-10-CM

## 2021-03-03 DIAGNOSIS — L72.0 EPIDERMAL INCLUSION CYST: ICD-10-CM

## 2021-03-03 DIAGNOSIS — D48.5 NEOPLASM OF UNCERTAIN BEHAVIOR OF SKIN: Primary | ICD-10-CM

## 2021-03-03 PROCEDURE — 11103 TANGNTL BX SKIN EA SEP/ADDL: CPT | Performed by: DERMATOLOGY

## 2021-03-03 PROCEDURE — 88342 IMHCHEM/IMCYTCHM 1ST ANTB: CPT | Performed by: STUDENT IN AN ORGANIZED HEALTH CARE EDUCATION/TRAINING PROGRAM

## 2021-03-03 PROCEDURE — 88305 TISSUE EXAM BY PATHOLOGIST: CPT | Performed by: STUDENT IN AN ORGANIZED HEALTH CARE EDUCATION/TRAINING PROGRAM

## 2021-03-03 PROCEDURE — 99244 OFF/OP CNSLTJ NEW/EST MOD 40: CPT | Performed by: DERMATOLOGY

## 2021-03-03 PROCEDURE — 11102 TANGNTL BX SKIN SINGLE LES: CPT | Performed by: DERMATOLOGY

## 2021-03-03 PROCEDURE — 88341 IMHCHEM/IMCYTCHM EA ADD ANTB: CPT | Performed by: STUDENT IN AN ORGANIZED HEALTH CARE EDUCATION/TRAINING PROGRAM

## 2021-03-03 NOTE — PATIENT INSTRUCTIONS
NEOPLASM OF UNCERTAIN BEHAVIOR OF SKIN    Assessment and Plan:   I have discussed with the patient that a sample of skin via a "skin biopsy would be potentially helpful to further make a specific diagnosis under the microscope   Based on a thorough discussion of this condition and the management approach to it (including a comprehensive discussion of the known risks, side effects and potential benefits of treatment), the patient (family) agrees to implement the following specific plan:    o Procedure:  Skin Biopsy  After a thorough discussion of treatment options and risk/benefits/alternatives (including but not limited to local pain, scarring, dyspigmentation, blistering, possible superinfection, and inability to confirm a diagnosis via histopathology), verbal and written consent were obtained and portion of the rash was biopsied for tissue sample  See below for consent that was obtained from patient and subsequent Procedure Note  INFORMED CONSENT DISCUSSION AND POST-OPERATIVE INSTRUCTIONS FOR PATIENT    I   RATIONALE FOR PROCEDURE  I understand that a skin biopsy allows the Dermatologist to test a lesion or rash under the microscope to obtain a diagnosis  It usually involves numbing the area with numbing medication and removing a small piece of skin; sometimes the area will be closed with sutures  In this specific procedure, sutures are not usually needed  If any sutures are placed, then they are usually need to be removed in 2 weeks or less  I understand that my Dermatologist recommends that a skin "shave" biopsy be performed today  A local anesthetic, similar to the kind that a dentist uses when filling a cavity, will be injected with a very small needle into the skin area to be sampled  The injected skin and tissue underneath "will go to sleep and become numb so no pain should be felt afterwards    An instrument shaped like a tiny "razor blade" (shave biopsy instrument) will be used to cut a small piece of tissue and skin from the area so that a sample of tissue can be taken and examined more closely under the microscope  A slight amount of bleeding will occur, but it will be stopped with direct pressure and a pressure bandage and any other appropriate methods  I understands that a scar will form where the wound was created  Surgical ointment will be applied to help protect the wound  Sutures are not usually needed  II   RISKS AND POTENTIAL COMPLICATIONS   I understand the risks and potential complications of a skin biopsy include but are not limited to the following:   Bleeding   Infection   Pain   Scar/keloid   Skin discoloration   Incomplete Removal   Recurrence   Nerve Damage/Numbness/Loss of Function   Allergic Reaction to Anesthesia   Biopsies are diagnostic procedures and based on findings additional treatment or evaluation may be required   Loss or destruction of specimen resulting in no additional findings    My Dermatologist has explained to me the nature of the condition, the nature of the procedure, and the benefits to be reasonably expected compared with alternative approaches  My Dermatologist has discussed the likelihood of major risks or complications of this procedure including the specific risks listed above, such as bleeding, infection, and scarring/keloid  I understand that a scar is expected after this procedure  I understand that my physician cannot predict if the scar will form a "keloid," which extends beyond the borders of the wound that is created  A keloid is a thick, painful, and bumpy scar  A keloid can be difficult to treat, as it does not always respond well to therapy, which includes injecting cortisone directly into the keloid every few weeks  While this usually reduces the pain and size of the scar, it does not eliminate it  I understand that photographs may be taken before and after the procedure    These will be maintained as part of the medical providers confidential records and may not be made available to me  I further authorize the medical provider to use the photographs for teaching purposes or to illustrate scientific papers, books, or lectures if in his/her judgment, medical research, education, or science may benefit from its use  I have had an opportunity to fully inquire about the risks and benefits of this procedure and its alternatives  I have been given ample time and opportunity to ask questions and to seek a second opinion if I wished to do so  I acknowledge that there have specifically been no guarantees as to the cosmetic results from the procedure  I am aware that with any procedure there is always the possibility of an unexpected complication  III  POST-PROCEDURAL CARE (WHAT YOU WILL NEED TO DO "AFTER THE BIOPSY" TO OPTIMIZE HEALING)     Keep the area clean and dry  Try NOT to remove the bandage or get it wet for the first 24 hours   Gently clean the area and apply surgical ointment (such as Vaseline petrolatum ointment, which is available "over the counter" and not a prescription) to the biopsy site for up to 2 weeks straight  This acts to protect the wound from the outside world   Sutures are not usually placed in this procedure  If any sutures were placed, return for suture removal as instructed (generally 1 week for the face, 2 weeks for the body)   Take Acetaminophen (Tylenol) for discomfort, if no contraindications  Ibuprofen or aspirin could make bleeding worse   Call our office immediately for signs of infection: fever, chills, increased redness, warmth, tenderness, discomfort/pain, or pus or foul smell coming from the wound  WHAT TO DO IF THERE IS ANY BLEEDING? If a small amount of bleeding is noticed, place a clean cloth over the area and apply firm pressure for ten minutes  Check the wound after 10 minutes of direct pressure    If bleeding persists, try one more time for an additional 10 minutes of direct pressure on the area  If the bleeding becomes heavier or does not stop after the second attempt, or if you have any other questions about this procedure, then please call your [facility] [address]'s Dermatologist by calling [phone] (SKIN)  I hereby acknowledge that I have reviewed and verified the site with my Dermatologist and have requested and authorized my Dermatologist to proceed with the procedure  EPIDERMAL INCLUSION CYST      Assessment and Plan:  Based on a thorough discussion of this condition and the management approach to it (including a comprehensive discussion of the known risks, side effects and potential benefits of treatment), the patient (family) agrees to implement the following specific plan:   Consider excision     What are epidermal inclusion cysts? Epidermal inclusion cysts are the most common, benign cutaneous cysts  There are many different names for epidermal inclusion cysts, including epidermoid cyst, epidermal cyst, infundibular cyst, inclusion cyst, and keratin cyst  These cysts can occur anywhere on the body and typically present as nodules directly underneath the skin  There is often a visible pore or opening in the center  The cysts are freely moveable and can range from a few millimeters to several centimeters in diameter  The center of epidermoid cysts almost always contains keratin, which has a cheesy appearance, and not sebum  They also do not originate from sebaceous glands  Therefore, epidermal inclusion cysts are not the same as sebaceous cysts  Cysts may remain stable or progressively enlarge over time  There are no reliable predictive factors to tell if an epidermal inclusion cyst will enlarge, become inflamed, or remain quiescent  Infected cysts tend to become larger, turn red, and are more noticeable to the patient  There may be accompanying pain and discomfort  What causes epidermal inclusion cysts?   Epidermal inclusion cysts often appear out of the blue and are not contagious  They are due to a proliferation of epidermal cells within the dermis and are more common in men than women  They occur more frequently in patients in their 20s to 45s  Epidermal inclusion cysts by themselves are usually not inherited, but they can be hereditary in rare syndromes such as Purdy syndrome, nodular elastosis with cysts and comedones (Favre-Racouchot syndrome), and basal cell nevus syndrome (Gorlin syndrome)  Elderly patients with chronic sun-damaged skin areas have a higher likelihood of developing epidermoid cysts  They often occur in areas where hair follicles have been inflamed or repeatedly irritated are more frequent in patients with acne vulgaris  In the  period, they are called milia  Patients on BRAF inhibitors such as imiquimod and cyclosporine have a higher incidence of epidermoid cysts of the face  How do we diagnose an epidermal inclusion cyst?  Epidermoid inclusion cysts are often diagnosed by history and physical exam  There is usually no need for biopsy prior to removal   Radiographic and laboratory exams, such as ultrasound studies, are unnecessary and not typically ordered unless the practitioner suspects a genetic condition  What is the treatment for an epidermal inclusion cyst?  Inflamed, uninfected epidermal inclusion cysts rarely resolve spontaneously without therapy or surgical intervention  Treatment is not emergent unless desired by the patient  Definitive treatment is via surgical excision with walls intact  This method will prevent recurrence  This is best done when the cyst is not inflamed, to decrease the probability of rupture during surgery     - A local anesthetic will be injected around the cyst  - A small incision is made in the skin overlying the cyst, and contents are expressed  - The incision is repaired with sutures    Another option is to use a 4mm punch biopsy with cyst extraction through the defect  Incision and drainage is often needed if the cyst is infected or inflamed  If there is surrounding cellulitis, oral antibiotic therapy may be necessary  The common agents used target methicillin sensitive Staphylococcal aureus and methicillin resistant S aureus in areas of high prevalence  MELANOCYTIC NEVI ("Moles")        Assessment and Plan:  Based on a thorough discussion of this condition and the management approach to it (including a comprehensive discussion of the known risks, side effects and potential benefits of treatment), the patient (family) agrees to implement the following specific plan:   Reassured benign   Monitor for changes      Melanocytic Nevi  Melanocytic nevi ("moles") are tan or brown, raised or flat areas of the skin which have an increased number of melanocytes  Melanocytes are the cells in our body which make pigment and account for skin color  Some moles are present at birth (I e , "congenital nevi"), while others come up later in life (i e , "acquired nevi")  The sun can stimulate the body to make more moles  Sunburns are not the only thing that triggers more moles  Chronic sun exposure can do it too  Clinically distinguishing a healthy mole from melanoma may be difficult, even for experienced dermatologists  The "ABCDE's" of moles have been suggested as a means of helping to alert a person to a suspicious mole and the possible increased risk of melanoma  The suggestions for raising alert are as follows:    Asymmetry: Healthy moles tend to be symmetric, while melanomas are often asymmetric  Asymmetry means if you draw a line through the mole, the two halves do not match in color, size, shape, or surface texture  Asymmetry can be a result of rapid enlargement of a mole, the development of a raised area on a previously flat lesion, scaling, ulceration, bleeding or scabbing within the mole    Any mole that starts to demonstrate "asymmetry" should be examined promptly by a board certified dermatologist      Kathy Marc: Healthy moles tend to have discrete, even borders  The border of a melanoma often blends into the normal skin and does not sharply delineate the mole from normal skin  Any mole that starts to demonstrate "uneven borders" should be examined promptly by a board certified dermatologist      Color: Healthy moles tend to be one color throughout  Melanomas tend to be made up of different colors ranging from dark black, blue, white, or red  Any mole that demonstrates a color change should be examined promptly by a board certified dermatologist      Diameter: Healthy moles tend to be smaller than 0 6 cm in size; an exception are "congenital nevi" that can be larger  Melanomas tend to grow and can often be greater than 0 6 cm (1/4 of an inch, or the size of a pencil eraser)  This is only a guideline, and many normal moles may be larger than 0 6 cm without being unhealthy  Any mole that starts to change in size (small to bigger or bigger to smaller) should be examined promptly by a board certified dermatologist      Evolving: Healthy moles tend to "stay the same "  Melanomas may often show signs of change or evolution such as a change in size, shape, color, or elevation  Any mole that starts to itch, bleed, crust, burn, hurt, or ulcerate or demonstrate a change or evolution should be examined promptly by a board certified dermatologist       Dysplastic Nevi  Dysplastic moles are moles that fit the ABCDE rules of melanoma but are not identified as melanomas when examined under the microscope  They may indicate an increased risk of melanoma in that person  If there is a family history of melanoma, most experts agree that the person may be at an increased risk for developing a melanoma  Experts still do not agree on what dysplastic moles mean in patients without a personal or family history of melanoma    Dysplastic moles are usually larger than common moles and have different colors within it with irregular borders  The appearance can be very similar to a melanoma  Biopsies of dysplastic moles may show abnormalities which are different from a regular mole  Melanoma  Malignant melanoma is a type of skin cancer that can be deadly if it spreads throughout the body  The incidence of melanoma in the [place] is growing faster than any other cancer  Melanoma usually grows near the surface of the skin for a period of time, and then begins to grow deeper into the skin  Once it grows deeper into the skin, the risk of spread to other organs greatly increases  Therefore, early detection and removal of a malignant melanoma may result in a better chance at a complete cure; removal after the tumor has spread may not be as effective, leading to worse clinical outcomes such as death  The true rate of nevus transformation into a melanoma is unknown  It has been estimated that the lifetime risk for any acquired melanocytic nevus on any 21year-old individual transforming into melanoma by age [de-identified] is 0 03% (1 in 3,164) for men and 0 009% (1 in 10,800) for women  The appearance of a "new mole" remains one of the most reliable methods for identifying a malignant melanoma  Occasionally, melanomas appear as rapidly growing, blue-black, dome-shaped bumps within a previous mole or previous area of normal skin  Other times, melanomas are suspected when a mole suddenly appears or changes  Itching, burning, or pain in a pigmented lesion should increase suspicion, but most patients with early melanoma have no skin discomfort whatsoever  Melanoma can occur anywhere on the skin, including areas that are difficult for self-examination  Many melanomas are first noticed by other family members  Suspicious-looking moles may be removed for microscopic examination  You may be able to prevent death from melanoma by doing two simple things:    1   Try to avoid unnecessary sun exposure and protect your skin when it is exposed to the sun  People who live near the equator, people who have intermittent exposures to large amounts of sun, and people who have had sunburns in childhood or adolescence have an increased risk for melanoma  Sun sense and vigilant sun protection may be keys to helping to prevent melanoma  We recommend wearing UPF-rated sun protective clothing and sunglasses whenever possible and applying a moisturizer-sunscreen combination product (SPF 50+) such as Neutrogena Daily Defense to sun exposed areas of skin at least three times a day  2  Have your moles regularly examined by a board certified dermatologist AND by yourself or a family member/friend at home  We recommend that you have your moles examined at least once a year by a board certified dermatologist   Use your birthday as an annual reminder to have your "Birthday Suit" (I e , your skin) examined; it is a nice birthday gift to yourself to know that your skin is healthy appearing! Additionally, at-home self examinations may be helpful for detecting a possible melanoma  Use the ABCDEs we discussed and check your moles once a month at home

## 2021-03-03 NOTE — PROGRESS NOTES
[facility] Note     Patient Name: Cathy Strickland  Encounter Date: 3/3/21     Have you been cared for by a [facility] Dermatologist in the last 3 years and, if so, which one? No    · Have you traveled outside of the [facility] in the past 3 months? No     May we call your Preferred Phone number to discuss your specific medical information? Yes     May we leave a detailed message that includes your specific medical information? Yes      Today's Chief Concerns:   Concern #1:  Area of concern on back     Concern #2:  Area of concern on right upper chest    Past Medical History:  Have you personally ever had or currently have any of the following? · Skin cancer (such as Melanoma, Basal Cell Carcinoma, Squamous Cell Carcinoma? (If Yes, please provide more detail)- No  · Eczema: No  · Psoriasis: No  · HIV/AIDS: No  · Hepatitis B or C: No  · Tuberculosis: No  · Systemic Immunosuppression such as Diabetes, Biologic or Immunotherapy, Chemotherapy, Organ Transplantation, Bone Marrow Transplantation (If YES, please provide more detail): No  · Radiation Treatment (If YES, please provide more detail): No  · Any other major medical conditions/concerns? (If Yes, which types)- yes heart murmur, shoulder pain    Social History:    What is/was your primary occupation? Unemployed     What are your hobbies/past-times? Walk    Family history:    Have any of your "first degree relatives" (parent, brother, sister, or child) had any of the following       · Skin cancer such as Melanoma or Merkel Cell Carcinoma or Pancreatic Cancer? No  · Eczema, Asthma, Hay Fever or Seasonal Allergies: No  · Psoriasis or Psoriatic Arthritis: No  · Do any other medical conditions seem to run in your family? If Yes, what condition and which relatives?   YES, mother had breast and throat cancer    Current Medications   Current Outpatient Medications:     bisacodyl 5 MG EC tablet, TAKE 2 TABLETS (10 MG TOTAL) BY MOUTH ONCE FOR 1 DOSE (Patient not taking: Reported on 1/11/2021), Disp: 2 tablet, Rfl: 0    Diclofenac Sodium (VOLTAREN) 1 %, APPLY ON THE SKIN FOUR TIMES A DAY AS NEEDED FOR KNEE PAIN, Disp: 200 g, Rfl: 0    lisinopril-hydrochlorothiazide (PRINZIDE,ZESTORETIC) 20-25 MG per tablet, Take 1 tablet by mouth daily (Patient taking differently: Take 1 tablet by mouth every morning ), Disp: 90 tablet, Rfl: 1    pantoprazole (PROTONIX) 40 mg tablet, TAKE 1 TABLET (40 MG TOTAL) BY MOUTH DAILY, Disp: 30 tablet, Rfl: 1      Review of Systems/System Alerts:  Have you recently had or currently have any of the following? If YES, what are you doing for the problem? · Fever, chills or unintended weight loss: No  · Sudden loss or change in your vision: No  · Nausea, vomiting or blood in your stool: No  · Painful or swollen joints: YES, right shoulder, left elbow and knee   · Wheezing or cough: YES, smoker  · Changing mole or non-healing wound: No  · Nosebleeds: No  · Excessive sweating: No  · Easy or prolonged bleeding? No  · Over the last 2 weeks, how often have you been bothered by the following problems? · Taking little interest or pleasure in doing things: 1 - Not at All  · Feeling down, depressed, or hopeless: 2 - Several days  · Rapid heartbeat with epinephrine:  No  · Any known allergies? YES, see below  Allergies   Allergen Reactions    Morphine And Related Vomiting   ·       PHYSICAL EXAM:       Was a chaperone (Derm Clinical Assistant) present throughout the entire Physical Exam? Yes     Did the Dermatology Team specifically  the patient on the importance of a Full Skin Exam to be sure that nothing is missed clinically?  Yes}  o Did the patient request or accept a Full Skin Exam?  Yes  o Did the patient specifically refuse to have the areas "under-the-bra" examined by the Dermatologist? No  o Did the patient specifically refuse to have the areas "under-the-underwear" examined by the Dermatologist? No      CONSTITUTIONAL:   There were no vitals filed for this visit  PSYCH: Normal mood and affect  EYES: Normal conjunctiva  ENT: Normal lips and oral mucosa  CARDIOVASCULAR: No edema  RESPIRATORY: Normal respirations  HEME/LYMPH/IMMUNO:  No regional lymphadenopathy except as noted below in ASSESSMENT AND PLAN BY DIAGNOSIS    SKIN:  FULL ORGAN SYSTEM EXAM  Hair, Scalp, Ears, Face Normal except as noted below in Assessment   Neck, Cervical Chain Nodes Normal except as noted below in Assessment   Right Arm/Hand/Fingers Normal except as noted below in Assessment   Left Arm/Hand/Fingers Normal except as noted below in Assessment   Chest/Breasts/Axillae Viewed areas Normal except as noted below in Assessment   Abdomen, Umbilicus Normal except as noted below in Assessment   Back/Spine Normal except as noted below in Assessment   Groin/Genitalia/Buttocks NOT EXAMINED   Right Leg, Foot, Toes Normal except as noted below in Assessment   Left Leg, Foot, Toes Normal except as noted below in Assessment        ASSESSMENT AND PLAN BY DIAGNOSIS:    History of Present Condition:     Duration:  How long has this been an issue for you?    o  years   Location Affected:  Where on the body is this affecting you?    o  back and right upper chest    Quality:  Is there any bleeding, pain, itch, burning/irritation, or redness associated with the skin lesion? o  right upper chest painful    Severity:  Describe any bleeding, pain, itch, burning/irritation, or redness on a scale of 1 to 10 (with 10 being the worst)  o  10 right upper chest   Timing:  Does this condition seem to be there pretty constantly or do you notice it more at specific times throughout the day?     o  constant    Context:  Have you ever noticed that this condition seems to be associated with specific activities you do?    o  denies    Modifying Factors:    o Anything that seems to make the condition worse?    -  right upper back more painful with touch   o What have you tried to do to make the condition better?    -  denies   Associated Signs and Symptoms:  Does this skin lesion seem to be associated with any of the following:  o  SL AMB DERM SIGNS AND SYMPTOMS: Redness     1  NEOPLASM OF UNCERTAIN BEHAVIOR OF SKIN    Physical Exam:   (Anatomic Location); (Size and Morphological Description); (Differential Diagnosis):  o A: Right posterior shoulder;skin;shave;54year old male with 1 cm pink and brown plaque; differential diagnosis: pigmented basal cell rule out melanoma   o B: Right upper abdomen;skin;shave; 54year old male with 8 mm fleshy papule; (Differential Diagnosis):Neurofibroma rule out skin tag   Pertinent Positives:   Pertinent Negatives: Additional History of Present Condition:  Found by primary care doctor     Assessment and Plan:   I have discussed with the patient that a sample of skin via a "skin biopsy would be potentially helpful to further make a specific diagnosis under the microscope   Based on a thorough discussion of this condition and the management approach to it (including a comprehensive discussion of the known risks, side effects and potential benefits of treatment), the patient (family) agrees to implement the following specific plan:    o Procedure:  Skin Biopsy  After a thorough discussion of treatment options and risk/benefits/alternatives (including but not limited to local pain, scarring, dyspigmentation, blistering, possible superinfection, and inability to confirm a diagnosis via histopathology), verbal and written consent were obtained and portion of the rash was biopsied for tissue sample  See below for consent that was obtained from patient and subsequent Procedure Note              A: PROCEDURE SHAVE BIOPSY NOTE:     Performing Physician: Loren Boss Anatomic Location; Clinical Description with size (cm); Pre-Op Diagnosis:   o A: Right posterior shoulder;skin;shave;54year old male with 1 cm pink and brown plaque; differential diagnosis: pigmented basal cell rule out melanoma    Post-op diagnosis: Same      Local anesthesia: 1% xylocaine with epi       Topical anesthesia: None     Hemostasis: Aluminum chloride       After obtaining informed consent  at which time there was a discussion about the purpose of biopsy  and low risks of infection and bleeding  The area was prepped and draped in the usual fashion  Anesthesia was obtained with 1% lidocaine with epinephrine  A shave biopsy to an appropriate sampling depth was obtained with a sterile blade (such as a 15-blade or DermaBlade)  The resulting wound was covered with surgical ointment and bandaged appropriately  The patient tolerated the procedure well without complications and was without signs of functional compromise  Specimen has been sent for review by Dermatopathology  Standard post-procedure care has been explained and has been included in written form within the patient's copy of Informed Consent  INFORMED CONSENT DISCUSSION AND POST-OPERATIVE INSTRUCTIONS FOR PATIENT    I   RATIONALE FOR PROCEDURE  I understand that a skin biopsy allows the Dermatologist to test a lesion or rash under the microscope to obtain a diagnosis  It usually involves numbing the area with numbing medication and removing a small piece of skin; sometimes the area will be closed with sutures  In this specific procedure, sutures are not usually needed  If any sutures are placed, then they are usually need to be removed in 2 weeks or less  I understand that my Dermatologist recommends that a skin "shave" biopsy be performed today  A local anesthetic, similar to the kind that a dentist uses when filling a cavity, will be injected with a very small needle into the skin area to be sampled  The injected skin and tissue underneath "will go to sleep and become numb so no pain should be felt afterwards    An instrument shaped like a tiny "razor blade" (shave biopsy instrument) will be used to cut a small piece of tissue and skin from the area so that a sample of tissue can be taken and examined more closely under the microscope  A slight amount of bleeding will occur, but it will be stopped with direct pressure and a pressure bandage and any other appropriate methods  I understands that a scar will form where the wound was created  Surgical ointment will be applied to help protect the wound  Sutures are not usually needed  II   RISKS AND POTENTIAL COMPLICATIONS   I understand the risks and potential complications of a skin biopsy include but are not limited to the following:   Bleeding   Infection   Pain   Scar/keloid   Skin discoloration   Incomplete Removal   Recurrence   Nerve Damage/Numbness/Loss of Function   Allergic Reaction to Anesthesia   Biopsies are diagnostic procedures and based on findings additional treatment or evaluation may be required   Loss or destruction of specimen resulting in no additional findings    My Dermatologist has explained to me the nature of the condition, the nature of the procedure, and the benefits to be reasonably expected compared with alternative approaches  My Dermatologist has discussed the likelihood of major risks or complications of this procedure including the specific risks listed above, such as bleeding, infection, and scarring/keloid  I understand that a scar is expected after this procedure  I understand that my physician cannot predict if the scar will form a "keloid," which extends beyond the borders of the wound that is created  A keloid is a thick, painful, and bumpy scar  A keloid can be difficult to treat, as it does not always respond well to therapy, which includes injecting cortisone directly into the keloid every few weeks  While this usually reduces the pain and size of the scar, it does not eliminate it        I understand that photographs may be taken before and after the procedure  These will be maintained as part of the medical providers confidential records and may not be made available to me  I further authorize the medical provider to use the photographs for teaching purposes or to illustrate scientific papers, books, or lectures if in his/her judgment, medical research, education, or science may benefit from its use  I have had an opportunity to fully inquire about the risks and benefits of this procedure and its alternatives  I have been given ample time and opportunity to ask questions and to seek a second opinion if I wished to do so  I acknowledge that there have specifically been no guarantees as to the cosmetic results from the procedure  I am aware that with any procedure there is always the possibility of an unexpected complication  III  POST-PROCEDURAL CARE (WHAT YOU WILL NEED TO DO "AFTER THE BIOPSY" TO OPTIMIZE HEALING)     Keep the area clean and dry  Try NOT to remove the bandage or get it wet for the first 24 hours   Gently clean the area and apply surgical ointment (such as Vaseline petrolatum ointment, which is available "over the counter" and not a prescription) to the biopsy site for up to 2 weeks straight  This acts to protect the wound from the outside world   Sutures are not usually placed in this procedure  If any sutures were placed, return for suture removal as instructed (generally 1 week for the face, 2 weeks for the body)   Take Acetaminophen (Tylenol) for discomfort, if no contraindications  Ibuprofen or aspirin could make bleeding worse   Call our office immediately for signs of infection: fever, chills, increased redness, warmth, tenderness, discomfort/pain, or pus or foul smell coming from the wound  WHAT TO DO IF THERE IS ANY BLEEDING? If a small amount of bleeding is noticed, place a clean cloth over the area and apply firm pressure for ten minutes  Check the wound after 10 minutes of direct pressure  If bleeding persists, try one more time for an additional 10 minutes of direct pressure on the area  If the bleeding becomes heavier or does not stop after the second attempt, or if you have any other questions about this procedure, then please call your [facility] Dermatologist by calling [phone] (SKIN)  I hereby acknowledge that I have reviewed and verified the site with my Dermatologist and have requested and authorized my Dermatologist to proceed with the procedure  B: PROCEDURE SHAVE BIOPSY NOTE:     Performing Physician: Kyle Rivera   Anatomic Location; Clinical Description with size (cm); Pre-Op Diagnosis:   B: Right upper abdomen;skin;shave; 54year old male with 8 mm fleshy papule; (Differential Diagnosis):Neurofibroma rule out skin tag    o  Post-op diagnosis: Same      Local anesthesia: 1% xylocaine with epi       Topical anesthesia: None     Hemostasis: Aluminum chloride       After obtaining informed consent  at which time there was a discussion about the purpose of biopsy  and low risks of infection and bleeding  The area was prepped and draped in the usual fashion  Anesthesia was obtained with 1% lidocaine with epinephrine  A shave biopsy to an appropriate sampling depth was obtained with a sterile blade (such as a 15-blade or DermaBlade)  The resulting wound was covered with surgical ointment and bandaged appropriately  The patient tolerated the procedure well without complications and was without signs of functional compromise  Specimen has been sent for review by Dermatopathology  Standard post-procedure care has been explained and has been included in written form within the patient's copy of Informed Consent  INFORMED CONSENT DISCUSSION AND POST-OPERATIVE INSTRUCTIONS FOR PATIENT    I   RATIONALE FOR PROCEDURE  I understand that a skin biopsy allows the Dermatologist to test a lesion or rash under the microscope to obtain a diagnosis    It usually involves numbing the area with numbing medication and removing a small piece of skin; sometimes the area will be closed with sutures  In this specific procedure, sutures are not usually needed  If any sutures are placed, then they are usually need to be removed in 2 weeks or less  I understand that my Dermatologist recommends that a skin "shave" biopsy be performed today  A local anesthetic, similar to the kind that a dentist uses when filling a cavity, will be injected with a very small needle into the skin area to be sampled  The injected skin and tissue underneath "will go to sleep and become numb so no pain should be felt afterwards  An instrument shaped like a tiny "razor blade" (shave biopsy instrument) will be used to cut a small piece of tissue and skin from the area so that a sample of tissue can be taken and examined more closely under the microscope  A slight amount of bleeding will occur, but it will be stopped with direct pressure and a pressure bandage and any other appropriate methods  I understands that a scar will form where the wound was created  Surgical ointment will be applied to help protect the wound  Sutures are not usually needed  II   RISKS AND POTENTIAL COMPLICATIONS   I understand the risks and potential complications of a skin biopsy include but are not limited to the following:   Bleeding   Infection   Pain   Scar/keloid   Skin discoloration   Incomplete Removal   Recurrence   Nerve Damage/Numbness/Loss of Function   Allergic Reaction to Anesthesia   Biopsies are diagnostic procedures and based on findings additional treatment or evaluation may be required   Loss or destruction of specimen resulting in no additional findings    My Dermatologist has explained to me the nature of the condition, the nature of the procedure, and the benefits to be reasonably expected compared with alternative approaches    My Dermatologist has discussed the likelihood of major risks or complications of this procedure including the specific risks listed above, such as bleeding, infection, and scarring/keloid  I understand that a scar is expected after this procedure  I understand that my physician cannot predict if the scar will form a "keloid," which extends beyond the borders of the wound that is created  A keloid is a thick, painful, and bumpy scar  A keloid can be difficult to treat, as it does not always respond well to therapy, which includes injecting cortisone directly into the keloid every few weeks  While this usually reduces the pain and size of the scar, it does not eliminate it  I understand that photographs may be taken before and after the procedure  These will be maintained as part of the medical providers confidential records and may not be made available to me  I further authorize the medical provider to use the photographs for teaching purposes or to illustrate scientific papers, books, or lectures if in his/her judgment, medical research, education, or science may benefit from its use  I have had an opportunity to fully inquire about the risks and benefits of this procedure and its alternatives  I have been given ample time and opportunity to ask questions and to seek a second opinion if I wished to do so  I acknowledge that there have specifically been no guarantees as to the cosmetic results from the procedure  I am aware that with any procedure there is always the possibility of an unexpected complication  III  POST-PROCEDURAL CARE (WHAT YOU WILL NEED TO DO "AFTER THE BIOPSY" TO OPTIMIZE HEALING)     Keep the area clean and dry  Try NOT to remove the bandage or get it wet for the first 24 hours   Gently clean the area and apply surgical ointment (such as Vaseline petrolatum ointment, which is available "over the counter" and not a prescription) to the biopsy site for up to 2 weeks straight  This acts to protect the wound from the outside world   Sutures are not usually placed in this procedure  If any sutures were placed, return for suture removal as instructed (generally 1 week for the face, 2 weeks for the body)   Take Acetaminophen (Tylenol) for discomfort, if no contraindications  Ibuprofen or aspirin could make bleeding worse   Call our office immediately for signs of infection: fever, chills, increased redness, warmth, tenderness, discomfort/pain, or pus or foul smell coming from the wound  WHAT TO DO IF THERE IS ANY BLEEDING? If a small amount of bleeding is noticed, place a clean cloth over the area and apply firm pressure for ten minutes  Check the wound after 10 minutes of direct pressure  If bleeding persists, try one more time for an additional 10 minutes of direct pressure on the area  If the bleeding becomes heavier or does not stop after the second attempt, or if you have any other questions about this procedure, then please call your [address] [place] [address]'s Dermatologist by calling [phone] (SKIN)  I hereby acknowledge that I have reviewed and verified the site with my Dermatologist and have requested and authorized my Dermatologist to proceed with the procedure  2  EPIDERMAL INCLUSION CYST    Physical Exam:   Anatomic Location Affected:  Right posterior neck, right lateral thight   Morphological Description:  1 cm cystic papule   Pertinent Positives:   Pertinent Negatives: Additional History of Present Condition:  Occasionally painful     Assessment and Plan:  Based on a thorough discussion of this condition and the management approach to it (including a comprehensive discussion of the known risks, side effects and potential benefits of treatment), the patient (family) agrees to implement the following specific plan:   Consider excision     What are epidermal inclusion cysts? Epidermal inclusion cysts are the most common, benign cutaneous cysts   There are many different names for epidermal inclusion cysts, including epidermoid cyst, epidermal cyst, infundibular cyst, inclusion cyst, and keratin cyst  These cysts can occur anywhere on the body and typically present as nodules directly underneath the skin  There is often a visible pore or opening in the center  The cysts are freely moveable and can range from a few millimeters to several centimeters in diameter  The center of epidermoid cysts almost always contains keratin, which has a cheesy appearance, and not sebum  They also do not originate from sebaceous glands  Therefore, epidermal inclusion cysts are not the same as sebaceous cysts  Cysts may remain stable or progressively enlarge over time  There are no reliable predictive factors to tell if an epidermal inclusion cyst will enlarge, become inflamed, or remain quiescent  Infected cysts tend to become larger, turn red, and are more noticeable to the patient  There may be accompanying pain and discomfort  What causes epidermal inclusion cysts? Epidermal inclusion cysts often appear out of the blue and are not contagious  They are due to a proliferation of epidermal cells within the dermis and are more common in men than women  They occur more frequently in patients in their 20s to 45s  Epidermal inclusion cysts by themselves are usually not inherited, but they can be hereditary in rare syndromes such as Purdy syndrome, nodular elastosis with cysts and comedones (Favre-Racouchot syndrome), and basal cell nevus syndrome (Gorlin syndrome)  Elderly patients with chronic sun-damaged skin areas have a higher likelihood of developing epidermoid cysts  They often occur in areas where hair follicles have been inflamed or repeatedly irritated are more frequent in patients with acne vulgaris  In the  period, they are called milia  Patients on BRAF inhibitors such as imiquimod and cyclosporine have a higher incidence of epidermoid cysts of the face      How do we diagnose an epidermal inclusion cyst?  Epidermoid inclusion cysts are often diagnosed by history and physical exam  There is usually no need for biopsy prior to removal   Radiographic and laboratory exams, such as ultrasound studies, are unnecessary and not typically ordered unless the practitioner suspects a genetic condition  What is the treatment for an epidermal inclusion cyst?  Inflamed, uninfected epidermal inclusion cysts rarely resolve spontaneously without therapy or surgical intervention  Treatment is not emergent unless desired by the patient  Definitive treatment is via surgical excision with walls intact  This method will prevent recurrence  This is best done when the cyst is not inflamed, to decrease the probability of rupture during surgery  - A local anesthetic will be injected around the cyst  - A small incision is made in the skin overlying the cyst, and contents are expressed  - The incision is repaired with sutures    Another option is to use a 4mm punch biopsy with cyst extraction through the defect  Incision and drainage is often needed if the cyst is infected or inflamed  If there is surrounding cellulitis, oral antibiotic therapy may be necessary  The common agents used target methicillin sensitive Staphylococcal aureus and methicillin resistant S aureus in areas of high prevalence       3   MELANOCYTIC NEVI ("Moles")    Physical Exam:   Anatomic Location Affected:   Mostly on sun-exposed areas of the trunk   Morphological Description:  Scattered, 1-4mm round to ovoid, symmetrical-appearing, even bordered, skin colored to dark brown macules/papules, mostly in sun-exposed areas   Pertinent Positives:   Pertinent Negatives:      Assessment and Plan:  Based on a thorough discussion of this condition and the management approach to it (including a comprehensive discussion of the known risks, side effects and potential benefits of treatment), the patient (family) agrees to implement the following specific plan:   Reassured benign    Monitor for changes    Recommend sunscreen     Melanocytic Nevi  Melanocytic nevi ("moles") are tan or brown, raised or flat areas of the skin which have an increased number of melanocytes  Melanocytes are the cells in our body which make pigment and account for skin color  Some moles are present at birth (I e , "congenital nevi"), while others come up later in life (i e , "acquired nevi")  The sun can stimulate the body to make more moles  Sunburns are not the only thing that triggers more moles  Chronic sun exposure can do it too  Clinically distinguishing a healthy mole from melanoma may be difficult, even for experienced dermatologists  The "ABCDE's" of moles have been suggested as a means of helping to alert a person to a suspicious mole and the possible increased risk of melanoma  The suggestions for raising alert are as follows:    Asymmetry: Healthy moles tend to be symmetric, while melanomas are often asymmetric  Asymmetry means if you draw a line through the mole, the two halves do not match in color, size, shape, or surface texture  Asymmetry can be a result of rapid enlargement of a mole, the development of a raised area on a previously flat lesion, scaling, ulceration, bleeding or scabbing within the mole  Any mole that starts to demonstrate "asymmetry" should be examined promptly by a board certified dermatologist      Border: Healthy moles tend to have discrete, even borders  The border of a melanoma often blends into the normal skin and does not sharply delineate the mole from normal skin  Any mole that starts to demonstrate "uneven borders" should be examined promptly by a board certified dermatologist      Color: Healthy moles tend to be one color throughout  Melanomas tend to be made up of different colors ranging from dark black, blue, white, or red    Any mole that demonstrates a color change should be examined promptly by a board certified dermatologist      Diameter: Healthy moles tend to be smaller than 0 6 cm in size; an exception are "congenital nevi" that can be larger  Melanomas tend to grow and can often be greater than 0 6 cm (1/4 of an inch, or the size of a pencil eraser)  This is only a guideline, and many normal moles may be larger than 0 6 cm without being unhealthy  Any mole that starts to change in size (small to bigger or bigger to smaller) should be examined promptly by a board certified dermatologist      Evolving: Healthy moles tend to "stay the same "  Melanomas may often show signs of change or evolution such as a change in size, shape, color, or elevation  Any mole that starts to itch, bleed, crust, burn, hurt, or ulcerate or demonstrate a change or evolution should be examined promptly by a board certified dermatologist       Dysplastic Nevi  Dysplastic moles are moles that fit the ABCDE rules of melanoma but are not identified as melanomas when examined under the microscope  They may indicate an increased risk of melanoma in that person  If there is a family history of melanoma, most experts agree that the person may be at an increased risk for developing a melanoma  Experts still do not agree on what dysplastic moles mean in patients without a personal or family history of melanoma  Dysplastic moles are usually larger than common moles and have different colors within it with irregular borders  The appearance can be very similar to a melanoma  Biopsies of dysplastic moles may show abnormalities which are different from a regular mole  Melanoma  Malignant melanoma is a type of skin cancer that can be deadly if it spreads throughout the body  The incidence of melanoma in the [place] is growing faster than any other cancer  Melanoma usually grows near the surface of the skin for a period of time, and then begins to grow deeper into the skin   Once it grows deeper into the skin, the risk of spread to other organs greatly increases  Therefore, early detection and removal of a malignant melanoma may result in a better chance at a complete cure; removal after the tumor has spread may not be as effective, leading to worse clinical outcomes such as death  The true rate of nevus transformation into a melanoma is unknown  It has been estimated that the lifetime risk for any acquired melanocytic nevus on any 21year-old individual transforming into melanoma by age [de-identified] is 0 03% (1 in 3,164) for men and 0 009% (1 in 10,800) for women  The appearance of a "new mole" remains one of the most reliable methods for identifying a malignant melanoma  Occasionally, melanomas appear as rapidly growing, blue-black, dome-shaped bumps within a previous mole or previous area of normal skin  Other times, melanomas are suspected when a mole suddenly appears or changes  Itching, burning, or pain in a pigmented lesion should increase suspicion, but most patients with early melanoma have no skin discomfort whatsoever  Melanoma can occur anywhere on the skin, including areas that are difficult for self-examination  Many melanomas are first noticed by other family members  Suspicious-looking moles may be removed for microscopic examination  You may be able to prevent death from melanoma by doing two simple things:    1  Try to avoid unnecessary sun exposure and protect your skin when it is exposed to the sun  People who live near the equator, people who have intermittent exposures to large amounts of sun, and people who have had sunburns in childhood or adolescence have an increased risk for melanoma  Sun sense and vigilant sun protection may be keys to helping to prevent melanoma  We recommend wearing UPF-rated sun protective clothing and sunglasses whenever possible and applying a moisturizer-sunscreen combination product (SPF 50+) such as Neutrogena Daily Defense to sun exposed areas of skin at least three times a day      2  Have your moles regularly examined by a board certified dermatologist AND by yourself or a family member/friend at home  We recommend that you have your moles examined at least once a year by a board certified dermatologist   Use your birthday as an annual reminder to have your "Birthday Suit" (I e , your skin) examined; it is a nice birthday gift to yourself to know that your skin is healthy appearing! Additionally, at-home self examinations may be helpful for detecting a possible melanoma  Use the ABCDEs we discussed and check your moles once a month at home        Scribe Attestation    I,:  Vilma Proctor am acting as a scribe while in the presence of the attending physician :       I,:  Renetta Wallace MD personally performed the services described in this documentation    as scribed in my presence :

## 2021-03-09 DIAGNOSIS — C43.9: Primary | ICD-10-CM

## 2021-03-09 NOTE — PROGRESS NOTES
Called and discussed results with patient  Bx R post shoulder c/w melanoma 1 1mm  Recommend evaluation and treatment by surgical oncology because of depth greater than 0 8mm  Patient agreeable  Patient instructed that will need full SE every 6 months for next 3 years

## 2021-03-09 NOTE — RESULT ENCOUNTER NOTE
Tissue Exam: [id number]  Order: [id number]  Status:  Final result   Visible to patient:  No (inaccessible in [address]) Dx:  Neoplasm of uncertain behavior of skin  Component   Case Report  Surgical Pathology Report                         Case: [id number]                                   Authorizing Provider: Marleny Rossi MD      Collected:           03/03/2021 1712              Ordering Location:     [facility]      Received:            03/03/2021 1712                                     [place]                                                                   Pathologist:           Magui Strickland MD                                                           Specimens:   A) - Skin, Other, Specimen A: Right posterior shoulder                                              B) - Skin, Other, Specimen B: Right upper abdomen                                        Final Diagnosis  A  Skin, right posterior shoulder, shave biopsy:     Consistent with MELANOMA (thickness of 1 1 mm) (see note)      Note: The presence of a dense inflammatory response and an associated dermal nevus complicates measurement of tumor thickness  SOX10 and MART-1 immunostains were performed and support the diagnosis  Please see synoptic report for additional details  Dr Apolinar Iverson was notified of results via email on  DERMATOPATHOLOGY RESULT NOTE    Results reviewed by ordering physician  Called patient to personally discuss results  Discussed results with patient         Instructions for Clinical Derm Team:   (remember to route Result Note to appropriate staff):    Put patient on recall list to schedule for full skin exam in 6 months    Result & Plan by Specimen:    Specimen A: malignant  Plan: referral to surgical oncology for further evaluation and management      Specimen B: benign  Plan: reassured, benign

## 2021-03-10 ENCOUNTER — TELEPHONE (OUTPATIENT)
Dept: HEMATOLOGY ONCOLOGY | Facility: CLINIC | Age: 56
End: 2021-03-10

## 2021-03-10 NOTE — TELEPHONE ENCOUNTER
New Patient Intake Heme Malignancy    Patient Details:    Pablito Ellis    1965    [id number]     Background Information:    [address] starts by opening a telephone encounter and gathering the following information   Who is calling to schedule?  self   Referring Provider Dr Michael Brunner   To Which Speciality? Surgical Oncology   Reason for Visit? New Diagnosis   Tumor Type/Diagnosis? 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 yes   Date of Tissue Diagnosis  If done outside of [facility] obtain report and slides 3/3/21   Is the patient aware of diagnosis? yes   Has Imaging been done? yes   If so, when and where? If outside of [facility], obtain records and disk [facility]   Has Bloodwork been done? Yes - 8/20/21   If so, when and where? If outside of [facility], obtain records  [facility]   Is there a personal history of cancer? If yes, what kind? no   Is there a family history of cancer? If so, what kind? Yes - mom - breast/throat   Scheduling Information:    Preferred [address]   Are there any days the patient cannot be seen? Miscellaneous:    After completing the above information, please route to finance, nurse navigation and clinical trials for review

## 2021-03-22 ENCOUNTER — TELEPHONE (OUTPATIENT)
Dept: GYNECOLOGIC ONCOLOGY | Facility: CLINIC | Age: 56
End: 2021-03-22

## 2021-03-22 PROBLEM — C43.61 MALIGNANT MELANOMA OF RIGHT SHOULDER (HCC): Status: ACTIVE | Noted: 2021-03-22

## 2021-03-22 NOTE — TELEPHONE ENCOUNTER
Spoke with patient to confirm and screen for 3/23/21 Surg Onc apt with Dr Aby Arroyo  Pt states that he has been having some pain under the R arm, in his arm pit, which began some time after the derm wash  He is concerned, because his mother experienced this when she had cancer effecting axiliary lymph nodes  Patient is not currently experiencing any symptoms of fever, cough, shortness of breath, chills, repeated shaking with chills, muscle pain, headache, sore throat, or new loss of taste/smell  Patient has not been tested for COVID-19  Patient has not been in contact with someone confirmed to have COVID-19  Reviewed visitor policy with patient  Reviewed masking policy with patient  Instructed patient to call office if any symptoms develop between now and appointment time

## 2021-03-23 ENCOUNTER — TELEPHONE (OUTPATIENT)
Dept: SURGICAL ONCOLOGY | Facility: CLINIC | Age: 56
End: 2021-03-23

## 2021-03-23 NOTE — TELEPHONE ENCOUNTER
Pt had surg  Onc  appt today for melanoma and called because they did not make it to the appt  In rescheduling this appt , I noticed that the pt had [address] [place]   This insurance can only be seen in [place]    They are going to call their derm back to get a proper referral

## 2021-03-24 ENCOUNTER — TELEPHONE (OUTPATIENT)
Dept: DERMATOLOGY | Age: 56
End: 2021-03-24

## 2021-03-24 DIAGNOSIS — K29.80 DUODENITIS: ICD-10-CM

## 2021-03-24 DIAGNOSIS — M25.569 KNEE PAIN, UNSPECIFIED CHRONICITY, UNSPECIFIED LATERALITY: ICD-10-CM

## 2021-03-24 DIAGNOSIS — C43.9: Primary | ICD-10-CM

## 2021-03-24 RX ORDER — PANTOPRAZOLE SODIUM 40 MG/1
40 TABLET, DELAYED RELEASE ORAL DAILY
Qty: 30 TABLET | Refills: 1 | Status: SHIPPED | OUTPATIENT
Start: 2021-03-24 | End: 2021-05-21

## 2021-03-24 NOTE — TELEPHONE ENCOUNTER
Patient called stated he went to schedule appointment at the cancer center in [place] and was told is insurance does not cover [place]  Patient has [place]  Please call patient

## 2021-03-25 ENCOUNTER — TELEPHONE (OUTPATIENT)
Dept: DERMATOLOGY | Age: 56
End: 2021-03-25

## 2021-03-25 ENCOUNTER — TELEPHONE (OUTPATIENT)
Dept: NEUROLOGY | Facility: CLINIC | Age: 56
End: 2021-03-25

## 2021-03-25 NOTE — TELEPHONE ENCOUNTER
Call patient    patient was given information name of general surgeon he will be seeing to have malignant melanoma removed Dr Jaja Holland address and phone number in [facility] was also provided

## 2021-03-25 NOTE — TELEPHONE ENCOUNTER
THE [facility] for patient to [address]  and confirm appointment with Dr Mitch Jacome  Gave direct numbers in [facility]

## 2021-03-29 ENCOUNTER — CONSULT (OUTPATIENT)
Dept: SURGERY | Facility: CLINIC | Age: 56
End: 2021-03-29
Payer: COMMERCIAL

## 2021-03-29 VITALS
SYSTOLIC BLOOD PRESSURE: 134 MMHG | HEART RATE: 84 BPM | WEIGHT: 315 LBS | HEIGHT: 72 IN | TEMPERATURE: 96.4 F | DIASTOLIC BLOOD PRESSURE: 80 MMHG | BODY MASS INDEX: 42.66 KG/M2

## 2021-03-29 DIAGNOSIS — Z01.818 PREOPERATIVE EXAMINATION: ICD-10-CM

## 2021-03-29 DIAGNOSIS — C43.61 MALIGNANT MELANOMA OF RIGHT SHOULDER (HCC): Primary | ICD-10-CM

## 2021-03-29 PROCEDURE — 99203 OFFICE O/P NEW LOW 30 MIN: CPT | Performed by: SURGERY
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
## 2021-03-30 ENCOUNTER — TELEPHONE (OUTPATIENT)
Dept: NEUROLOGY | Facility: CLINIC | Age: 56
End: 2021-03-30

## 2021-03-31 ENCOUNTER — TRANSCRIBE ORDERS (OUTPATIENT)
Dept: ADMINISTRATIVE | Facility: HOSPITAL | Age: 56
End: 2021-03-31

## 2021-03-31 ENCOUNTER — OFFICE VISIT (OUTPATIENT)
Dept: LAB | Facility: HOSPITAL | Age: 56
End: 2021-03-31
Attending: SURGERY
Payer: COMMERCIAL

## 2021-03-31 ENCOUNTER — APPOINTMENT (OUTPATIENT)
Dept: LAB | Facility: HOSPITAL | Age: 56
End: 2021-03-31
Attending: SURGERY
Payer: COMMERCIAL

## 2021-03-31 ENCOUNTER — CONSULT (OUTPATIENT)
Dept: NEUROLOGY | Facility: CLINIC | Age: 56
End: 2021-03-31
Payer: COMMERCIAL

## 2021-03-31 VITALS
BODY MASS INDEX: 42.66 KG/M2 | SYSTOLIC BLOOD PRESSURE: 108 MMHG | HEART RATE: 93 BPM | HEIGHT: 72 IN | DIASTOLIC BLOOD PRESSURE: 78 MMHG | WEIGHT: 315 LBS

## 2021-03-31 DIAGNOSIS — Z01.818 PREOPERATIVE EXAMINATION: ICD-10-CM

## 2021-03-31 DIAGNOSIS — Z01.818 PREOPERATIVE EXAMINATION, UNSPECIFIED: Primary | ICD-10-CM

## 2021-03-31 DIAGNOSIS — M54.12 CERVICAL RADICULOPATHY: ICD-10-CM

## 2021-03-31 DIAGNOSIS — M54.2 NECK PAIN: Primary | ICD-10-CM

## 2021-03-31 DIAGNOSIS — M79.601 RIGHT ARM PAIN: ICD-10-CM

## 2021-03-31 LAB
ALBUMIN SERPL BCP-MCNC: 3.4 G/DL (ref 3.5–5)
ALP SERPL-CCNC: 71 U/L (ref 46–116)
ALT SERPL W P-5'-P-CCNC: 25 U/L (ref 12–78)
ANION GAP SERPL CALCULATED.3IONS-SCNC: 7 MMOL/L (ref 4–13)
AST SERPL W P-5'-P-CCNC: 15 U/L (ref 5–45)
BASOPHILS # BLD AUTO: 0.03 THOUSANDS/ΜL (ref 0–0.1)
BASOPHILS NFR BLD AUTO: 0 % (ref 0–1)
BILIRUB SERPL-MCNC: 0.6 MG/DL (ref 0.2–1)
BUN SERPL-MCNC: 21 MG/DL (ref 5–25)
CALCIUM ALBUM COR SERPL-MCNC: 9.8 MG/DL (ref 8.3–10.1)
CALCIUM SERPL-MCNC: 9.3 MG/DL (ref 8.3–10.1)
CHLORIDE SERPL-SCNC: 100 MMOL/L (ref 100–108)
CO2 SERPL-SCNC: 29 MMOL/L (ref 21–32)
CREAT SERPL-MCNC: 1.04 MG/DL (ref 0.6–1.3)
EOSINOPHIL # BLD AUTO: 0.17 THOUSAND/ΜL (ref 0–0.61)
EOSINOPHIL NFR BLD AUTO: 2 % (ref 0–6)
ERYTHROCYTE [DISTWIDTH] IN BLOOD BY AUTOMATED COUNT: 13.4 % (ref 11.6–15.1)
GFR SERPL CREATININE-BSD FRML MDRD: 80 ML/MIN/1.73SQ M
GLUCOSE SERPL-MCNC: 99 MG/DL (ref 65–140)
HCT VFR BLD AUTO: 45.9 % (ref 36.5–49.3)
HGB BLD-MCNC: 14.8 G/DL (ref 12–17)
IMM GRANULOCYTES # BLD AUTO: 0.02 THOUSAND/UL (ref 0–0.2)
IMM GRANULOCYTES NFR BLD AUTO: 0 % (ref 0–2)
LYMPHOCYTES # BLD AUTO: 2.43 THOUSANDS/ΜL (ref 0.6–4.47)
LYMPHOCYTES NFR BLD AUTO: 23 % (ref 14–44)
MCH RBC QN AUTO: 29.5 PG (ref 26.8–34.3)
MCHC RBC AUTO-ENTMCNC: 32.2 G/DL (ref 31.4–37.4)
MCV RBC AUTO: 92 FL (ref 82–98)
MONOCYTES # BLD AUTO: 0.85 THOUSAND/ΜL (ref 0.17–1.22)
MONOCYTES NFR BLD AUTO: 8 % (ref 4–12)
NEUTROPHILS # BLD AUTO: 6.93 THOUSANDS/ΜL (ref 1.85–7.62)
NEUTS SEG NFR BLD AUTO: 67 % (ref 43–75)
NRBC BLD AUTO-RTO: 0 /100 WBCS
PLATELET # BLD AUTO: 289 THOUSANDS/UL (ref 149–390)
PMV BLD AUTO: 9.9 FL (ref 8.9–12.7)
POTASSIUM SERPL-SCNC: 3.8 MMOL/L (ref 3.5–5.3)
PROT SERPL-MCNC: 7.5 G/DL (ref 6.4–8.2)
RBC # BLD AUTO: 5.01 MILLION/UL (ref 3.88–5.62)
SODIUM SERPL-SCNC: 136 MMOL/L (ref 136–145)
WBC # BLD AUTO: 10.43 THOUSAND/UL (ref 4.31–10.16)

## 2021-03-31 PROCEDURE — 3074F SYST BP LT 130 MM HG: CPT | Performed by: PSYCHIATRY & NEUROLOGY

## 2021-03-31 PROCEDURE — 3078F DIAST BP <80 MM HG: CPT | Performed by: PSYCHIATRY & NEUROLOGY

## 2021-03-31 PROCEDURE — 36415 COLL VENOUS BLD VENIPUNCTURE: CPT | Performed by: SURGERY

## 2021-03-31 PROCEDURE — 99244 OFF/OP CNSLTJ NEW/EST MOD 40: CPT | Performed by: PSYCHIATRY & NEUROLOGY

## 2021-03-31 PROCEDURE — 4004F PT TOBACCO SCREEN RCVD TLK: CPT | Performed by: PSYCHIATRY & NEUROLOGY

## 2021-03-31 PROCEDURE — 3008F BODY MASS INDEX DOCD: CPT | Performed by: PSYCHIATRY & NEUROLOGY

## 2021-03-31 PROCEDURE — 80053 COMPREHEN METABOLIC PANEL: CPT | Performed by: SURGERY

## 2021-03-31 PROCEDURE — 85025 COMPLETE CBC W/AUTO DIFF WBC: CPT | Performed by: SURGERY
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
## 2021-04-02 NOTE — PRE-PROCEDURE INSTRUCTIONS
My Surgical Experience    The following information was developed to assist you to prepare for your operation  What do I need to do before coming to the hospital?   Arrange for a responsible person to drive you to and from the hospital    Arrange care for your children at home  Children are not allowed in the recovery areas of the hospital   Plan to wear clothing that is easy to put on and take off  If you are having shoulder surgery, wear a shirt that buttons or zippers in the front  Bathing  o Shower the evening before and the morning of your surgery with an antibacterial soap  Please refer to the Pre Op Showering Instructions for Surgery Patients Sheet   o Remove nail polish and all body piercing jewelry  o Do not shave any body part for at least 24 hours before surgery-this includes face, arms, legs and upper body  Food  o Nothing to eat or drink after midnight the night before your surgery  This includes candy and chewing gum  o Exception: If your surgery is after 12:00pm (noon), you may have clear liquids such as 7-Up®, ginger ale, apple or cranberry juice, Jell-O®, water, or clear broth until 8:00 am  o Do not drink milk or juice with pulp on the morning before surgery  o Do not drink alcohol 24 hours before surgery  Medicine  o Follow instructions you received from your surgeon about which medicines you may take on the day of surgery  o If instructed to take medicine on the morning of surgery, take pills with just a small sip of water  Call your prescribing doctor for specific infroamtion on what to do if you take insulin    What should I bring to the hospital?    Bring:  Olamide Vigil or a walker, if you have them, for foot or knee surgery   A list of the daily medicines, vitamins, minerals, herbals and nutritional supplements you take   Include the dosages of medicines and the time you take them each day   Glasses, dentures or hearing aids   Minimal clothing; you will be wearing hospital sleepwear   Photo ID; required to verify your identity   If you have a Living Will or Power of , bring a copy of the documents   If you have an ostomy, bring an extra pouch and any supplies you use    Do not bring   Medicines or inhalers   Money, valuables or jewelry    What other information should I know about the day of surgery?  Notify your surgeons if you develop a cold, sore throat, cough, fever, rash or any other illness   Report to the Ambulatory Surgical/Same Day Surgery Unit   You will be instructed to stop at Registration only if you have not been pre-registered   Inform your  fi they do not stay that they will be asked by the staff to leave a phone number where they can be reached   Be available to be reached before surgery  In the event the operating room schedule changes, you may be asked to come in earlier or later than expected    *It is important to tell your doctor and others involved in your health care if you are taking or have been taking any non-prescription drugs, vitamins, minerals, herbals or other nutritional supplements  Any of these may interact with some food or medicines and cause a reaction      Pre-Surgery Instructions:   Medication Instructions    Diclofenac Sodium (VOLTAREN) 1 % Instructed patient per Anesthesia Guidelines   lisinopril-hydrochlorothiazide (PRINZIDE,ZESTORETIC) 20-25 MG per tablet Instructed patient per Anesthesia Guidelines   pantoprazole (PROTONIX) 40 mg tablet Instructed patient per Anesthesia Guidelines  To take protonix only a m  of surgery

## 2021-04-03 DIAGNOSIS — Z01.818 PREOPERATIVE EXAMINATION: ICD-10-CM

## 2021-04-03 LAB — SARS-COV-2 RNA RESP QL NAA+PROBE: NEGATIVE

## 2021-04-03 PROCEDURE — U0003 INFECTIOUS AGENT DETECTION BY NUCLEIC ACID (DNA OR RNA); SEVERE ACUTE RESPIRATORY SYNDROME CORONAVIRUS 2 (SARS-COV-2) (CORONAVIRUS DISEASE [COVID-19]), AMPLIFIED PROBE TECHNIQUE, MAKING USE OF HIGH THROUGHPUT TECHNOLOGIES AS DESCRIBED BY CMS-2020-01-R: HCPCS | Performed by: SURGERY

## 2021-04-03 PROCEDURE — U0005 INFEC AGEN DETEC AMPLI PROBE: HCPCS | Performed by: SURGERY

## 2021-04-07 ENCOUNTER — ANESTHESIA EVENT (OUTPATIENT)
Dept: PERIOP | Facility: HOSPITAL | Age: 56
End: 2021-04-07
Payer: COMMERCIAL

## 2021-04-09 ENCOUNTER — ANESTHESIA (OUTPATIENT)
Dept: PERIOP | Facility: HOSPITAL | Age: 56
End: 2021-04-09
Payer: COMMERCIAL

## 2021-04-09 ENCOUNTER — HOSPITAL ENCOUNTER (OUTPATIENT)
Facility: HOSPITAL | Age: 56
Setting detail: OUTPATIENT SURGERY
Discharge: HOME | End: 2021-04-09
Attending: SURGERY | Admitting: SURGERY
Payer: COMMERCIAL

## 2021-04-09 ENCOUNTER — HOSPITAL ENCOUNTER (OUTPATIENT)
Dept: RADIOLOGY | Facility: HOSPITAL | Age: 56
Discharge: HOME | End: 2021-04-09
Payer: COMMERCIAL

## 2021-04-09 VITALS
WEIGHT: 315 LBS | RESPIRATION RATE: 18 BRPM | SYSTOLIC BLOOD PRESSURE: 138 MMHG | TEMPERATURE: 95.7 F | DIASTOLIC BLOOD PRESSURE: 94 MMHG | HEIGHT: 72 IN | BODY MASS INDEX: 42.66 KG/M2 | OXYGEN SATURATION: 99 % | HEART RATE: 74 BPM

## 2021-04-09 DIAGNOSIS — C43.61 MALIGNANT MELANOMA OF RIGHT SHOULDER (HCC): Primary | ICD-10-CM

## 2021-04-09 DIAGNOSIS — Z01.818 PREOPERATIVE EXAMINATION: ICD-10-CM

## 2021-04-09 DIAGNOSIS — C43.61 MALIGNANT MELANOMA OF RIGHT SHOULDER (HCC): ICD-10-CM

## 2021-04-09 PROBLEM — F17.200 SMOKING: Status: ACTIVE | Noted: 2021-04-09

## 2021-04-09 PROCEDURE — 88307 TISSUE EXAM BY PATHOLOGIST: CPT | Performed by: PATHOLOGY

## 2021-04-09 PROCEDURE — 88305 TISSUE EXAM BY PATHOLOGIST: CPT | Performed by: PATHOLOGY

## 2021-04-09 PROCEDURE — 88341 IMHCHEM/IMCYTCHM EA ADD ANTB: CPT | Performed by: PATHOLOGY

## 2021-04-09 PROCEDURE — 78195 LYMPH SYSTEM IMAGING: CPT

## 2021-04-09 PROCEDURE — 21933 EXC BACK TUM DEEP 5 CM/>: CPT | Performed by: SURGERY

## 2021-04-09 PROCEDURE — 88342 IMHCHEM/IMCYTCHM 1ST ANTB: CPT | Performed by: PATHOLOGY

## 2021-04-09 PROCEDURE — 38500 BIOPSY/REMOVAL LYMPH NODES: CPT | Performed by: SURGERY

## 2021-04-09 PROCEDURE — A9541 TC99M SULFUR COLLOID: HCPCS

## 2021-04-09 PROCEDURE — 99024 POSTOP FOLLOW-UP VISIT: CPT | Performed by: SURGERY

## 2021-04-09 PROCEDURE — G1004 CDSM NDSC: HCPCS

## 2021-04-09 RX ORDER — BUPIVACAINE HYDROCHLORIDE AND EPINEPHRINE 5; 5 MG/ML; UG/ML
INJECTION, SOLUTION EPIDURAL; INTRACAUDAL; PERINEURAL AS NEEDED
Status: DISCONTINUED | OUTPATIENT
Start: 2021-04-09 | End: 2021-04-09 | Stop reason: HOSPADM

## 2021-04-09 RX ORDER — SODIUM CHLORIDE, SODIUM LACTATE, POTASSIUM CHLORIDE, CALCIUM CHLORIDE 600; 310; 30; 20 MG/100ML; MG/100ML; MG/100ML; MG/100ML
125 INJECTION, SOLUTION INTRAVENOUS CONTINUOUS
Status: DISCONTINUED | OUTPATIENT
Start: 2021-04-09 | End: 2021-04-09

## 2021-04-09 RX ORDER — FENTANYL CITRATE/PF 50 MCG/ML
50 SYRINGE (ML) INJECTION
Status: DISCONTINUED | OUTPATIENT
Start: 2021-04-09 | End: 2021-04-09 | Stop reason: HOSPADM

## 2021-04-09 RX ORDER — SODIUM CHLORIDE, SODIUM LACTATE, POTASSIUM CHLORIDE, CALCIUM CHLORIDE 600; 310; 30; 20 MG/100ML; MG/100ML; MG/100ML; MG/100ML
125 INJECTION, SOLUTION INTRAVENOUS CONTINUOUS
Status: DISCONTINUED | OUTPATIENT
Start: 2021-04-09 | End: 2021-04-09 | Stop reason: HOSPADM

## 2021-04-09 RX ORDER — MIDAZOLAM HYDROCHLORIDE 2 MG/2ML
INJECTION, SOLUTION INTRAMUSCULAR; INTRAVENOUS AS NEEDED
Status: DISCONTINUED | OUTPATIENT
Start: 2021-04-09 | End: 2021-04-09

## 2021-04-09 RX ORDER — MAGNESIUM HYDROXIDE 1200 MG/15ML
LIQUID ORAL AS NEEDED
Status: DISCONTINUED | OUTPATIENT
Start: 2021-04-09 | End: 2021-04-09 | Stop reason: HOSPADM

## 2021-04-09 RX ORDER — NEOSTIGMINE METHYLSULFATE 1 MG/ML
INJECTION INTRAVENOUS AS NEEDED
Status: DISCONTINUED | OUTPATIENT
Start: 2021-04-09 | End: 2021-04-09

## 2021-04-09 RX ORDER — ROCURONIUM BROMIDE 10 MG/ML
INJECTION, SOLUTION INTRAVENOUS AS NEEDED
Status: DISCONTINUED | OUTPATIENT
Start: 2021-04-09 | End: 2021-04-09

## 2021-04-09 RX ORDER — ISOSULFAN BLUE 50 MG/5ML
INJECTION, SOLUTION SUBCUTANEOUS AS NEEDED
Status: DISCONTINUED | OUTPATIENT
Start: 2021-04-09 | End: 2021-04-09 | Stop reason: HOSPADM

## 2021-04-09 RX ORDER — DEXAMETHASONE SODIUM PHOSPHATE 10 MG/ML
INJECTION, SOLUTION INTRAMUSCULAR; INTRAVENOUS AS NEEDED
Status: DISCONTINUED | OUTPATIENT
Start: 2021-04-09 | End: 2021-04-09

## 2021-04-09 RX ORDER — ONDANSETRON 2 MG/ML
4 INJECTION INTRAMUSCULAR; INTRAVENOUS ONCE AS NEEDED
Status: COMPLETED | OUTPATIENT
Start: 2021-04-09 | End: 2021-04-09

## 2021-04-09 RX ORDER — FENTANYL CITRATE 50 UG/ML
INJECTION, SOLUTION INTRAMUSCULAR; INTRAVENOUS AS NEEDED
Status: DISCONTINUED | OUTPATIENT
Start: 2021-04-09 | End: 2021-04-09

## 2021-04-09 RX ORDER — GLYCOPYRROLATE 0.2 MG/ML
INJECTION INTRAMUSCULAR; INTRAVENOUS AS NEEDED
Status: DISCONTINUED | OUTPATIENT
Start: 2021-04-09 | End: 2021-04-09

## 2021-04-09 RX ORDER — PROMETHAZINE HYDROCHLORIDE 25 MG/ML
25 INJECTION, SOLUTION INTRAMUSCULAR; INTRAVENOUS ONCE AS NEEDED
Status: DISCONTINUED | OUTPATIENT
Start: 2021-04-09 | End: 2021-04-09 | Stop reason: HOSPADM

## 2021-04-09 RX ORDER — PROPOFOL 10 MG/ML
INJECTION, EMULSION INTRAVENOUS AS NEEDED
Status: DISCONTINUED | OUTPATIENT
Start: 2021-04-09 | End: 2021-04-09

## 2021-04-09 RX ORDER — LIDOCAINE HYDROCHLORIDE 10 MG/ML
INJECTION, SOLUTION EPIDURAL; INFILTRATION; INTRACAUDAL; PERINEURAL AS NEEDED
Status: DISCONTINUED | OUTPATIENT
Start: 2021-04-09 | End: 2021-04-09

## 2021-04-09 RX ORDER — OXYCODONE HYDROCHLORIDE AND ACETAMINOPHEN 5; 325 MG/1; MG/1
1 TABLET ORAL EVERY 6 HOURS PRN
Qty: 8 TABLET | Refills: 0 | Status: SHIPPED | OUTPATIENT
Start: 2021-04-09 | End: 2021-04-09 | Stop reason: SDUPTHER

## 2021-04-09 RX ORDER — ONDANSETRON 2 MG/ML
INJECTION INTRAMUSCULAR; INTRAVENOUS AS NEEDED
Status: DISCONTINUED | OUTPATIENT
Start: 2021-04-09 | End: 2021-04-09

## 2021-04-09 RX ORDER — OXYCODONE HYDROCHLORIDE AND ACETAMINOPHEN 5; 325 MG/1; MG/1
1 TABLET ORAL EVERY 6 HOURS PRN
Qty: 20 TABLET | Refills: 0 | Status: SHIPPED | OUTPATIENT
Start: 2021-04-09

## 2021-04-09 RX ADMIN — LIDOCAINE HYDROCHLORIDE 50 MG: 10 INJECTION, SOLUTION EPIDURAL; INFILTRATION; INTRACAUDAL; PERINEURAL at 14:10

## 2021-04-09 RX ADMIN — FENTANYL CITRATE 100 MCG: 50 INJECTION, SOLUTION INTRAMUSCULAR; INTRAVENOUS at 15:21

## 2021-04-09 RX ADMIN — PHENYLEPHRINE HYDROCHLORIDE 200 MCG: 10 INJECTION INTRAVENOUS at 14:22

## 2021-04-09 RX ADMIN — PROPOFOL 100 MG: 10 INJECTION, EMULSION INTRAVENOUS at 14:12

## 2021-04-09 RX ADMIN — CEFAZOLIN 3000 MG: 1 INJECTION, POWDER, FOR SOLUTION INTRAVENOUS at 14:15

## 2021-04-09 RX ADMIN — SODIUM CHLORIDE, SODIUM LACTATE, POTASSIUM CHLORIDE, AND CALCIUM CHLORIDE 125 ML/HR: .6; .31; .03; .02 INJECTION, SOLUTION INTRAVENOUS at 11:23

## 2021-04-09 RX ADMIN — PROPOFOL 200 MG: 10 INJECTION, EMULSION INTRAVENOUS at 14:10

## 2021-04-09 RX ADMIN — ONDANSETRON 4 MG: 2 INJECTION INTRAMUSCULAR; INTRAVENOUS at 14:19

## 2021-04-09 RX ADMIN — FENTANYL CITRATE 100 MCG: 50 INJECTION, SOLUTION INTRAMUSCULAR; INTRAVENOUS at 14:10

## 2021-04-09 RX ADMIN — ROCURONIUM BROMIDE 50 MG: 10 INJECTION, SOLUTION INTRAVENOUS at 14:11

## 2021-04-09 RX ADMIN — ONDANSETRON 4 MG: 2 INJECTION INTRAMUSCULAR; INTRAVENOUS at 16:12

## 2021-04-09 RX ADMIN — FENTANYL CITRATE 50 MCG: 50 INJECTION, SOLUTION INTRAMUSCULAR; INTRAVENOUS at 16:25

## 2021-04-09 RX ADMIN — DEXAMETHASONE SODIUM PHOSPHATE 4 MG: 10 INJECTION, SOLUTION INTRAMUSCULAR; INTRAVENOUS at 14:19

## 2021-04-09 RX ADMIN — GLYCOPYRROLATE 0.6 MG: 0.2 INJECTION, SOLUTION INTRAMUSCULAR; INTRAVENOUS at 15:41

## 2021-04-09 RX ADMIN — NEOSTIGMINE METHYLSULFATE 4 MG: 1 INJECTION INTRAVENOUS at 15:41

## 2021-04-09 RX ADMIN — ROCURONIUM BROMIDE 20 MG: 10 INJECTION, SOLUTION INTRAVENOUS at 14:59

## 2021-04-09 RX ADMIN — MIDAZOLAM 2 MG: 1 INJECTION INTRAMUSCULAR; INTRAVENOUS at 13:57

## 2021-04-09 NOTE — DISCHARGE INSTRUCTIONS
Excision of Back Lesion and Lymph Node Biopsy  Postoperative Care Instructions        1  General: You may feel pulling sensations around the wound or funny aches and pains around the incisions  This is normal  Even minor surgery is a change in your body and this is your body's reaction to it  2  Wound care: The glue over the incisions will fall off over the next week or two  If you have staples or stitches, they will be removed by the physician at your follow up appointment  3  Showering: You may shower  Please do not soak wound in standing water such as a bath, hot tub, pool, lake, etc  Do not scrub or use exfoliants on the surgical wounds  4  Activity: You may go up and down stairs, walk as much as you are comfortable, but walk at least 3 times each day  If you have had abdominal surgery, do not perform any strenuous exercise or lift anything heavier than 15 pounds for at least 4 weeks, unless cleared by your physician  5  Diet: You may resume your regular diet  Please drink lots of water  6  Medications: Resume all of your previous medications, unless told otherwise by the doctor  A good option for pain control is to start with acetaminophen(Tylenol) 650mg and ibuprofen(Advil) 600mg and alternate taking them every 3 hours  If this is not sufficient then you make take the narcotic pain medicine as prescribed  You do not need to take the narcotic pain medication unless you are having significant pain and discomfort  Please take the narcotic medication with food  Insure that you do not take more than 4000 mg of Tylenol per day  7  Driving: You will need someone to drive you home on the day of surgery  Do not drive or make any important decisions while on narcotic pain medication  Generally, you may drive 48 hours after you've stopped taking all narcotic pain medications  8  Upset Stomach: You may take Maalox, Tums, or similar items for an upset stomach   If your narcotic pain medication causes an upset stomach, do not take it on an empty stomach  Try taking it with at least some crackers or toast      9  Constipation: Patients often experience constipation after surgery  We recommend starting an over-the-counter medication for this, such as Metamucil, Senokot, Colace, milk of magnesia, etc  You may stop taking these medications a couple days after your last dose of narcotic medication  If you experience significant nausea or vomiting after abdominal surgery, call the office before trying any of these medications  10  Call the office: If you are experiencing any of the following: fevers above 101 5°, significant nausea or vomiting, if the wound develops drainage and/or excessive redness around the wound, or if you have significant diarrhea or other worsening symptoms  11  Pain: A prescription for narcotic pain medication will be sent to your pharmacy upon discharge from the hospital           Excision of Skin Lesion   WHAT YOU NEED TO KNOW:   Excision of a skin lesion is surgery to remove a piece of skin tissue  The skin tissue may be malignant (skin cancer) or it may be benign  Benign means the skin tissue does not have cancer cells and cannot spread  DISCHARGE INSTRUCTIONS:   Seek care immediately if:   · Your stitches come apart and the wound opens  Contact your healthcare provider if:   · You have pain in your incision that does not get better with medicine  · You have a fever or chills  · Your wound is red, swollen, bleeding, or draining pus  · You have questions or concerns about your condition or care  Care for your wound as directed:  Carefully wash the wound with soap and water  Dry the area and put on new, clean bandages as directed  Change your bandages when they get wet or dirty  You may take a shower 24 hours after  your surgery  Do not  soak in water (bathtub, hot tub, swimming pool) until after your stitches have been removed   Check your wound for signs of infection such as redness, swelling, or pus drainage  Follow up with your healthcare provider as directed: You will need to return to have your stitches removed  Write down your questions so you remember to ask them during your visits  © Copyright [address] Information is for End User's use only and may not be sold, redistributed or otherwise used for commercial purposes  All illustrations and images included in CareNotes® are the copyrighted property of [employer]  or [facility]   The above information is an  only  It is not intended as medical advice for individual conditions or treatments  Talk to your doctor, nurse or pharmacist before following any medical regimen to see if it is safe and effective for you

## 2021-04-09 NOTE — ANESTHESIA POSTPROCEDURE EVALUATION
Post-Op Assessment Note    CV Status:  Stable  Pain Score: 0    Pain management: adequate     Mental Status:  Alert   Hydration Status:  Stable   PONV Controlled:  None   Airway Patency:  Patent   Two or more mitigation strategies used for obstructive sleep apnea   Post Op Vitals Reviewed: Yes      Staff: CRNA         No complications documented      BP   142/69   Temp  98   Pulse  75   Resp   16   SpO2   100

## 2021-04-09 NOTE — INTERVAL H&P NOTE
H&P reviewed  After examining the patient I find no changes in the patients condition since the H&P had been written  Vitals:    04/09/21 1100   BP: 105/64   Pulse: 77   Resp: 20   Temp: (!) 95 7 °F (35 4 °C)   SpO2: 96%     Lymphoscintigraphy has been performed:       IMPRESSION:     1    [place] lymph node localized to the right axillary tail /anterior chest wall as well as to the posterior lateral axilla

## 2021-04-09 NOTE — OP NOTE
OPERATIVE REPORT  PATIENT NAME: Telma Bradley    :  1965  MRN: [id number]  Pt Location: [facility] OR ROOM 01    SURGERY DATE: 2021    Surgeon(s) and Role:     * Alfredo Heller MD - Primary     * Surendra Anglin MD - [place] [facility] [place] [facility]  [address] , YANNICK - Assisting     * Fannie Dodson PA-C - Assisting    Preop Diagnosis:  Malignant melanoma of right shoulder ([address] ) [C43 61]    Post-Op Diagnosis Codes:     * Malignant melanoma of right shoulder ([address] ) [C43 61]    Procedure(s) (LRB):  EXCISION MELANOMA LESION RIGHT BACK (Right)  BIOPSY LYMPH NODE SENTINEL OF AXILLA (Right)    Specimen(s):  ID Type Source Tests Collected by Time Destination   1 : SENTINEL NODES RIGHT AXILLA  Tissue Mass TISSUE Raul Woodson MD 2021 6904    2 : Will Pean Right Back Tissue Back TISSUE EXAM Alfredo Heller MD 2021 [id number]        Estimated Blood Loss:   5 mL    Drains:  * No LDAs found *    Anesthesia Type:   General    Operative Indications:  Malignant melanoma of right shoulder ([facility] [address] ) [C43 61]      Operative Findings:  Status post shave biopsy right upper back and normal axilla    Complications:   None    Procedure and Technique:  1  Novelty lymph node biopsy  2  Wide local excision melanoma right back  Patient was taken back to main operating room, placed supine on the operating table, general anesthesia was induced, and the patient was rolled to the left and Lymphazurin dye was injected into the dermis around his upper back shave biopsy site  The patient was then placed supine again and the right axilla was prepped and draped in normal fashion  An incision was made in the axilla  Utilizing the gamma probe, 3 radioactive lymph nodes which were also green in color were clearly identified  Clips were based in the tissue around the lymph nodes and then the lymph nodes were excised  Ten-second count of this group of lymph nodes was 3600  Background count of the lymph node basin was negligible    Axilla was irrigated and suctioned with strict hemostasis by Bovie cautery  The axilla was closed in multiple layers 3 0 Vicryl as well as the dermis  Four 0 Monocryl was used to approximate the skin edges  Exofin was placed as a dressing  The patient was then placed prone and the right shoulder was prepped and draped in normal fashion  2 cm margins around the shave biopsy site was marked  This necessitated a  5 cm diameter circular skin and incision  An ellipse was then drawn 14 cm long and 5 cm wide  The skin was incised and soft tissue was divided with Bovie cautery down to fascia overlying his scapular muscles  The specimen was then removed and included skin and subcutaneous tissue down to muscle fascia  A short stitch was placed superior and long stitch lateral   The specimen was then passed off the field  The large wound was then copiously irrigated strict hemostasis was obtained with Bovie cautery  Two 0 Vicryl was used to approximate the fascia as well as the subcutaneous tissue  Three 0 Vicryl was used to approximate the dermis  Staples were used to approximate the skin  Local anesthesia was infiltrated around the wound and sterile dressing was placed  The patient awoke from general anesthesia, was extubated in the operating room, sent to the PACU in stable condition         I was present for the entire procedure and A qualified resident physician was not available    Patient Disposition:  PACU     SIGNATURE: Brenden Rodriguez MD  DATE: April 9, 2021  TIME: 3:55 PM

## 2021-04-09 NOTE — ANESTHESIA PREPROCEDURE EVALUATION
Addended by: WILFREDO GUERRA on: 12/27/2019 11:27 AM     Modules accepted: Orders     Procedure:  EXCISION  BIOPSY LESION/MASS BACK  sentinel lymph node biopsy, nuclear med at 18 (Right Back)    Relevant Problems   ANESTHESIA (within normal limits)      CARDIO   (+) Aortic valve stenosis   (+) Chest pain   (+) Hypertension      PULMONARY   (+) Sleep apnea   (+) Smoking        Physical Exam    Airway    Mallampati score: II  TM Distance: >3 FB  Neck ROM: full     Dental   Comment: Upper partial denture ,     Cardiovascular  Rhythm: regular, Rate: normal,     Pulmonary  Breath sounds clear to auscultation,     Other Findings        Anesthesia Plan  ASA Score- 3     Anesthesia Type- general with ASA Monitors  Additional Monitors:   Airway Plan: ETT  Plan Factors-Exercise tolerance (METS): >4 METS  Chart reviewed  EKG reviewed  Existing labs reviewed  Patient summary reviewed  Patient is a current smoker  Patient instructed to abstain from smoking on day of procedure  Patient did not smoke on day of surgery  Obstructive sleep apnea risk education given perioperatively  Induction- intravenous  Postoperative Plan- Plan for postoperative opioid use  Planned trial extubation    Informed Consent- Anesthetic plan and risks discussed with patient  I personally reviewed this patient with the CRNA  Discussed and agreed on the Anesthesia Plan with the CRNA  Mary Wyatt

## 2021-04-09 NOTE — INTERVAL H&P NOTE
H&P reviewed  After examining the patient I find no changes in the patients condition since the H&P had been written  Vitals:    04/09/21 1100   BP: 105/64   Pulse: 77   Resp: 20   Temp: (!) 95 7 °F (35 4 °C)   SpO2: 96%       Lymphoscintigraphy has been performed  There is an anterior lymph node and 2 posterior lymph nodes identified  I discussed these findings at length with Saintclair Mis from nuclear Medicine

## 2021-04-19 ENCOUNTER — OFFICE VISIT (OUTPATIENT)
Dept: SURGERY | Facility: CLINIC | Age: 56
End: 2021-04-19

## 2021-04-19 VITALS
SYSTOLIC BLOOD PRESSURE: 132 MMHG | WEIGHT: 315 LBS | DIASTOLIC BLOOD PRESSURE: 78 MMHG | BODY MASS INDEX: 42.66 KG/M2 | HEART RATE: 85 BPM | TEMPERATURE: 96.9 F | HEIGHT: 72 IN

## 2021-04-19 DIAGNOSIS — Z09 POSTOPERATIVE EXAMINATION: ICD-10-CM

## 2021-04-19 DIAGNOSIS — C43.61 MALIGNANT MELANOMA OF RIGHT SHOULDER (HCC): Primary | ICD-10-CM

## 2021-04-19 PROCEDURE — 99024 POSTOP FOLLOW-UP VISIT: CPT | Performed by: SURGERY

## 2021-04-19 PROCEDURE — 3008F BODY MASS INDEX DOCD: CPT | Performed by: PSYCHIATRY & NEUROLOGY

## 2021-04-19 NOTE — PROGRESS NOTES
Patient is status post wide local excision of a melanoma from his right posterior shoulder as well as sentinel lymph node biopsy from his right axilla  Surgery was performed on  9 April 2021  Pathology report:   Pathology report is still pending     patient has minimal pain and no wound complaints  incision healing well  Staples removed and Steri-Strips placed  Axillary incision healing well  No sutures for removal       Explained to the patient that the pathology report is pending  I will call him with the results  Oncology consult depending upon pathology report

## 2021-04-23 ENCOUNTER — HOSPITAL ENCOUNTER (OUTPATIENT)
Dept: RADIOLOGY | Facility: HOSPITAL | Age: 56
Discharge: HOME | End: 2021-04-23
Attending: PSYCHIATRY & NEUROLOGY
Payer: COMMERCIAL

## 2021-04-23 DIAGNOSIS — M79.601 RIGHT ARM PAIN: ICD-10-CM

## 2021-04-23 DIAGNOSIS — M54.2 NECK PAIN: ICD-10-CM

## 2021-04-23 DIAGNOSIS — M54.12 CERVICAL RADICULOPATHY: ICD-10-CM

## 2021-04-23 PROCEDURE — G1004 CDSM NDSC: HCPCS

## 2021-04-23 PROCEDURE — 72141 MRI NECK SPINE W/O DYE: CPT

## 2021-04-27 NOTE — PROGRESS NOTES
[facility] - PROCEDURE RESPONSE NOTE    Based on the query that was sent to you, please document below any procedures that were performed  The location of the melanoma and subsequent wide local excision  was the right posterior shoulder       Thank you,   Dr Chico Beth

## 2021-04-30 ENCOUNTER — TELEPHONE (OUTPATIENT)
Dept: NEUROLOGY | Facility: CLINIC | Age: 56
End: 2021-04-30

## 2021-04-30 DIAGNOSIS — M47.812 OSTEOARTHRITIS OF CERVICAL SPINE, UNSPECIFIED SPINAL OSTEOARTHRITIS COMPLICATION STATUS: Primary | ICD-10-CM

## 2021-04-30 NOTE — TELEPHONE ENCOUNTER
----- Message from Pavithra Martinez MD sent at 4/28/2021  5:05 PM EDT -----  Patient's cervical spine MRI shows moderate right > left b/l foraminal stenosis at C4-5  This would explain his UE symptoms  If he wants further treatment, can consider epidural injection and we can refer him

## 2021-04-30 NOTE — TELEPHONE ENCOUNTER
I called and spoke with the patient; explained results  He will think about the epidural injections and let us know if he would like you to place an order/referral   He does not like to receive injections and feels that maybe it would not help for very long and then he would have to get another injection

## 2021-05-08 ENCOUNTER — HOSPITAL ENCOUNTER (EMERGENCY)
Facility: HOSPITAL | Age: 56
Discharge: HOME | End: 2021-05-08
Attending: EMERGENCY MEDICINE | Admitting: EMERGENCY MEDICINE
Payer: COMMERCIAL

## 2021-05-08 ENCOUNTER — APPOINTMENT (EMERGENCY)
Dept: RADIOLOGY | Facility: HOSPITAL | Age: 56
End: 2021-05-08
Payer: COMMERCIAL

## 2021-05-08 VITALS
OXYGEN SATURATION: 98 % | TEMPERATURE: 99 F | RESPIRATION RATE: 18 BRPM | BODY MASS INDEX: 43.67 KG/M2 | WEIGHT: 315 LBS | HEART RATE: 80 BPM | DIASTOLIC BLOOD PRESSURE: 78 MMHG | SYSTOLIC BLOOD PRESSURE: 103 MMHG

## 2021-05-08 DIAGNOSIS — M54.9 BACK PAIN: Primary | ICD-10-CM

## 2021-05-08 LAB
ALBUMIN SERPL BCP-MCNC: 3.2 G/DL (ref 3.5–5)
ALP SERPL-CCNC: 79 U/L (ref 46–116)
ALT SERPL W P-5'-P-CCNC: 21 U/L (ref 12–78)
ANION GAP SERPL CALCULATED.3IONS-SCNC: 10 MMOL/L (ref 4–13)
AST SERPL W P-5'-P-CCNC: 15 U/L (ref 5–45)
BASOPHILS # BLD AUTO: 0.04 THOUSANDS/ΜL (ref 0–0.1)
BASOPHILS NFR BLD AUTO: 0 % (ref 0–1)
BILIRUB SERPL-MCNC: 0.58 MG/DL (ref 0.2–1)
BILIRUB UR QL STRIP: NEGATIVE
BUN SERPL-MCNC: 25 MG/DL (ref 5–25)
CALCIUM ALBUM COR SERPL-MCNC: 9.5 MG/DL (ref 8.3–10.1)
CALCIUM SERPL-MCNC: 8.9 MG/DL (ref 8.3–10.1)
CHLORIDE SERPL-SCNC: 101 MMOL/L (ref 100–108)
CLARITY UR: CLEAR
CO2 SERPL-SCNC: 26 MMOL/L (ref 21–32)
COLOR UR: YELLOW
CREAT SERPL-MCNC: 1.19 MG/DL (ref 0.6–1.3)
EOSINOPHIL # BLD AUTO: 0.12 THOUSAND/ΜL (ref 0–0.61)
EOSINOPHIL NFR BLD AUTO: 1 % (ref 0–6)
ERYTHROCYTE [DISTWIDTH] IN BLOOD BY AUTOMATED COUNT: 13.1 % (ref 11.6–15.1)
GFR SERPL CREATININE-BSD FRML MDRD: 68 ML/MIN/1.73SQ M
GLUCOSE SERPL-MCNC: 92 MG/DL (ref 65–140)
GLUCOSE UR STRIP-MCNC: NEGATIVE MG/DL
HCT VFR BLD AUTO: 42.9 % (ref 36.5–49.3)
HGB BLD-MCNC: 14.5 G/DL (ref 12–17)
HGB UR QL STRIP.AUTO: NEGATIVE
IMM GRANULOCYTES # BLD AUTO: 0.04 THOUSAND/UL (ref 0–0.2)
IMM GRANULOCYTES NFR BLD AUTO: 0 % (ref 0–2)
KETONES UR STRIP-MCNC: NEGATIVE MG/DL
LEUKOCYTE ESTERASE UR QL STRIP: NEGATIVE
LYMPHOCYTES # BLD AUTO: 1.84 THOUSANDS/ΜL (ref 0.6–4.47)
LYMPHOCYTES NFR BLD AUTO: 16 % (ref 14–44)
MCH RBC QN AUTO: 30 PG (ref 26.8–34.3)
MCHC RBC AUTO-ENTMCNC: 33.8 G/DL (ref 31.4–37.4)
MCV RBC AUTO: 89 FL (ref 82–98)
MONOCYTES # BLD AUTO: 1.29 THOUSAND/ΜL (ref 0.17–1.22)
MONOCYTES NFR BLD AUTO: 12 % (ref 4–12)
NEUTROPHILS # BLD AUTO: 7.88 THOUSANDS/ΜL (ref 1.85–7.62)
NEUTS SEG NFR BLD AUTO: 71 % (ref 43–75)
NITRITE UR QL STRIP: NEGATIVE
NRBC BLD AUTO-RTO: 0 /100 WBCS
PH UR STRIP.AUTO: 6 [PH]
PLATELET # BLD AUTO: 260 THOUSANDS/UL (ref 149–390)
PMV BLD AUTO: 9.4 FL (ref 8.9–12.7)
POTASSIUM SERPL-SCNC: 3.7 MMOL/L (ref 3.5–5.3)
PROT SERPL-MCNC: 7.6 G/DL (ref 6.4–8.2)
PROT UR STRIP-MCNC: NEGATIVE MG/DL
RBC # BLD AUTO: 4.83 MILLION/UL (ref 3.88–5.62)
SODIUM SERPL-SCNC: 137 MMOL/L (ref 136–145)
SP GR UR STRIP.AUTO: 1.02 (ref 1–1.03)
UROBILINOGEN UR QL STRIP.AUTO: 0.2 E.U./DL
WBC # BLD AUTO: 11.21 THOUSAND/UL (ref 4.31–10.16)

## 2021-05-08 PROCEDURE — 74176 CT ABD & PELVIS W/O CONTRAST: CPT

## 2021-05-08 PROCEDURE — 96374 THER/PROPH/DIAG INJ IV PUSH: CPT

## 2021-05-08 PROCEDURE — 85025 COMPLETE CBC W/AUTO DIFF WBC: CPT | Performed by: EMERGENCY MEDICINE

## 2021-05-08 PROCEDURE — 96361 HYDRATE IV INFUSION ADD-ON: CPT

## 2021-05-08 PROCEDURE — 81003 URINALYSIS AUTO W/O SCOPE: CPT | Performed by: EMERGENCY MEDICINE

## 2021-05-08 PROCEDURE — 36415 COLL VENOUS BLD VENIPUNCTURE: CPT | Performed by: EMERGENCY MEDICINE

## 2021-05-08 PROCEDURE — 80053 COMPREHEN METABOLIC PANEL: CPT | Performed by: EMERGENCY MEDICINE

## 2021-05-08 PROCEDURE — 99284 EMERGENCY DEPT VISIT MOD MDM: CPT | Performed by: EMERGENCY MEDICINE

## 2021-05-08 PROCEDURE — 99284 EMERGENCY DEPT VISIT MOD MDM: CPT

## 2021-05-08 RX ORDER — LIDOCAINE 50 MG/G
1 PATCH TOPICAL DAILY
Qty: 30 PATCH | Refills: 0 | Status: SHIPPED | OUTPATIENT
Start: 2021-05-08 | End: 2021-08-31

## 2021-05-08 RX ORDER — KETOROLAC TROMETHAMINE 30 MG/ML
15 INJECTION, SOLUTION INTRAMUSCULAR; INTRAVENOUS ONCE
Status: COMPLETED | OUTPATIENT
Start: 2021-05-08 | End: 2021-05-08

## 2021-05-08 RX ORDER — LIDOCAINE 50 MG/G
1 PATCH TOPICAL ONCE
Status: DISCONTINUED | OUTPATIENT
Start: 2021-05-08 | End: 2021-05-08 | Stop reason: HOSPADM

## 2021-05-08 RX ADMIN — SODIUM CHLORIDE 1000 ML: 0.9 INJECTION, SOLUTION INTRAVENOUS at 12:30

## 2021-05-08 RX ADMIN — LIDOCAINE 5% 1 PATCH: 700 PATCH TOPICAL at 12:31

## 2021-05-08 RX ADMIN — KETOROLAC TROMETHAMINE 15 MG: 30 INJECTION, SOLUTION INTRAMUSCULAR at 12:32
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
## 2021-05-11 ENCOUNTER — TELEPHONE (OUTPATIENT)
Dept: NEUROLOGY | Facility: CLINIC | Age: 56
End: 2021-05-11

## 2021-05-11 NOTE — TELEPHONE ENCOUNTER
Pt called in asking for pain management's number  See encounter from 4/30/21  Pt agreeable to injections  He was made aware that referral was placed  Pt also asking to move up EMG  He will call central scheduling  If EMG can be moved up he will call our office to also move up his follow up appt with Dr Alina Mon

## 2021-05-18 DIAGNOSIS — I10 HYPERTENSION: ICD-10-CM

## 2021-05-18 DIAGNOSIS — K29.80 DUODENITIS: ICD-10-CM

## 2021-05-18 DIAGNOSIS — M25.569 KNEE PAIN, UNSPECIFIED CHRONICITY, UNSPECIFIED LATERALITY: ICD-10-CM

## 2021-05-18 RX ORDER — LISINOPRIL AND HYDROCHLOROTHIAZIDE 25; 20 MG/1; MG/1
1 TABLET ORAL DAILY
Qty: 90 TABLET | Refills: 1 | Status: SHIPPED | OUTPATIENT
Start: 2021-05-18

## 2021-05-21 RX ORDER — PANTOPRAZOLE SODIUM 40 MG/1
40 TABLET, DELAYED RELEASE ORAL DAILY
Qty: 30 TABLET | Refills: 1 | Status: SHIPPED | OUTPATIENT
Start: 2021-05-21 | End: 2021-06-16 | Stop reason: SDUPTHER

## 2021-05-25 ENCOUNTER — RA CDI HCC (OUTPATIENT)
Dept: OTHER | Facility: HOSPITAL | Age: 56
End: 2021-05-25

## 2021-05-25 NOTE — PROGRESS NOTES
[facility]  coding opportunities          Chart reviewed, no opportunity found: CHART REVIEWED, NO OPPORTUNITY FOUND              Patients insurance company: [employer] (Medicare and Commercial for [employer] and [employer])

## 2021-06-01 ENCOUNTER — OFFICE VISIT (OUTPATIENT)
Dept: FAMILY MEDICINE CLINIC | Facility: CLINIC | Age: 56
End: 2021-06-01
Payer: COMMERCIAL

## 2021-06-01 VITALS
OXYGEN SATURATION: 96 % | HEART RATE: 82 BPM | WEIGHT: 311.4 LBS | TEMPERATURE: 97.1 F | RESPIRATION RATE: 18 BRPM | BODY MASS INDEX: 42.18 KG/M2 | DIASTOLIC BLOOD PRESSURE: 70 MMHG | HEIGHT: 72 IN | SYSTOLIC BLOOD PRESSURE: 112 MMHG

## 2021-06-01 DIAGNOSIS — R07.0 THROAT PAIN: Primary | ICD-10-CM

## 2021-06-01 PROCEDURE — 99213 OFFICE O/P EST LOW 20 MIN: CPT | Performed by: FAMILY MEDICINE

## 2021-06-01 RX ORDER — LIDOCAINE 246 MG/1
PATCH TOPICAL
COMMUNITY
Start: 2021-05-12

## 2021-06-01 RX ORDER — LIDOCAINE HCL 4 %
LIQUID ROLL-ON (ML) TOPICAL
COMMUNITY
Start: 2021-05-10

## 2021-06-01 NOTE — PROGRESS NOTES
Assessment/Plan:      Diagnoses and all orders for this visit:    Throat pain    Other orders  -     Aspercreme Lidocaine 4 % [facility]; PLACE 1 PATCH TOPICALLY EVERY DAY AS NEEDED  -     Aspercreme w/Lidocaine 4 % CREA; APPLY 1 APPLICATION TOPICALLY TO AFFECTED AREA 3 TIMES PER DAY        Patient had EGD in December 2020 per GI  Was started on pantoprazole 40 mg daily  Found to have multiple polyps,  10 removed - 8 were precancerous, 2 benign  Biopsies taken at that time were negative  Plan at that time was for patient to schedule modified barium swallow study, oropharyngeal dysphagia noted and a chin tuck was recommended  -   Advised patient to follow-up with GI for worsening symptoms    Patient has appointment tomorrow with pain management for chronic pain problems he has been experiencing (workup negative so far per orthopedics)  Subjective:     Patient ID: Elie Benites is a 54 y o  male  Sore Throat   This is a chronic problem  There has been no fever  Associated symptoms include neck pain  Noticed worsening throat problems since colonoscopy/EGD on December 2020  Feels constant throat discomfort and has pain with eating and drinking fluids  Feels a mass in his throat  Pain with swallowing  Has associated pain with chewing in his neck  Says he's been producing more mucus and more frequent rhinorrhea  Review of Systems   HENT: Positive for sore throat  Musculoskeletal: Positive for neck pain  R upper arm pain, intermittent R sided nerve pain   Skin:        New lesions on RUE, concerned for melanoma given recent history         Objective:     Physical Exam  Constitutional:       Appearance: He is obese  HENT:      Mouth/Throat:      Mouth: Mucous membranes are moist       Pharynx: Oropharynx is clear  Cardiovascular:      Rate and Rhythm: Normal rate and regular rhythm  Heart sounds: Normal heart sounds  Pulmonary:      Effort: No respiratory distress        Breath sounds: Normal breath sounds  No wheezing  Musculoskeletal:      Right lower leg: No edema  Left lower leg: No edema  Skin:     Findings: Lesion (on RUE, dark, uniform in color, <1 cm) present  Neurological:      Mental Status: He is oriented to person, place, and time  Depression Screening Follow-up Plan: Patient's depression screening was positive with a PHQ-2 score of   Their PHQ-9 score was   Patient assessed for underlying major depression  They have no active suicidal ideations  Brief counseling provided and recommend additional follow-up/re-evaluation next office visit

## 2021-06-02 ENCOUNTER — TELEPHONE (OUTPATIENT)
Dept: PAIN MEDICINE | Facility: CLINIC | Age: 56
End: 2021-06-02

## 2021-06-02 ENCOUNTER — CONSULT (OUTPATIENT)
Dept: PAIN MEDICINE | Facility: CLINIC | Age: 56
End: 2021-06-02
Payer: COMMERCIAL

## 2021-06-02 VITALS
BODY MASS INDEX: 42.12 KG/M2 | DIASTOLIC BLOOD PRESSURE: 78 MMHG | SYSTOLIC BLOOD PRESSURE: 114 MMHG | HEIGHT: 72 IN | HEART RATE: 83 BPM | WEIGHT: 311 LBS

## 2021-06-02 DIAGNOSIS — M54.12 CERVICAL RADICULOPATHY: ICD-10-CM

## 2021-06-02 DIAGNOSIS — M54.2 NECK PAIN: ICD-10-CM

## 2021-06-02 DIAGNOSIS — M79.645 PAIN OF LEFT THUMB: ICD-10-CM

## 2021-06-02 DIAGNOSIS — G89.4 CHRONIC PAIN SYNDROME: Primary | ICD-10-CM

## 2021-06-02 DIAGNOSIS — M48.02 CERVICAL SPINAL STENOSIS: ICD-10-CM

## 2021-06-02 PROCEDURE — 99244 OFF/OP CNSLTJ NEW/EST MOD 40: CPT | Performed by: ANESTHESIOLOGY

## 2021-06-02 RX ORDER — GABAPENTIN 300 MG/1
300 CAPSULE ORAL 3 TIMES DAILY
Qty: 90 CAPSULE | Refills: 0 | Status: SHIPPED | OUTPATIENT
Start: 2021-06-02 | End: 2021-07-29 | Stop reason: SDUPTHER

## 2021-06-02 NOTE — TELEPHONE ENCOUNTER
Scheduled patient for 7/1/21  Patient denies RX blood thinners/ NSAIDS  Nothing to eat or drink 1 hour prior to procedure  Needs to arrange transportation  Proper clothing for procedure  No vaccines 2 weeks prior or after procedure  If ill or place on antibiotics, please call to reschedule    COVID test be to done on 6/25/21 at [facility]
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
## 2021-06-08 ENCOUNTER — APPOINTMENT (OUTPATIENT)
Dept: RADIOLOGY | Facility: CLINIC | Age: 56
End: 2021-06-08
Payer: COMMERCIAL

## 2021-06-08 ENCOUNTER — OFFICE VISIT (OUTPATIENT)
Dept: DERMATOLOGY | Age: 56
End: 2021-06-08
Payer: COMMERCIAL

## 2021-06-08 ENCOUNTER — OFFICE VISIT (OUTPATIENT)
Dept: OBGYN CLINIC | Facility: CLINIC | Age: 56
End: 2021-06-08
Payer: COMMERCIAL

## 2021-06-08 VITALS — HEIGHT: 72 IN | WEIGHT: 314.6 LBS | TEMPERATURE: 98.3 F | BODY MASS INDEX: 42.61 KG/M2

## 2021-06-08 VITALS — HEIGHT: 72 IN | WEIGHT: 311 LBS | BODY MASS INDEX: 42.12 KG/M2

## 2021-06-08 DIAGNOSIS — M79.645 PAIN OF LEFT THUMB: ICD-10-CM

## 2021-06-08 DIAGNOSIS — Z85.820 HISTORY OF MELANOMA: ICD-10-CM

## 2021-06-08 DIAGNOSIS — D48.5 NEOPLASM OF UNCERTAIN BEHAVIOR OF SKIN: Primary | ICD-10-CM

## 2021-06-08 DIAGNOSIS — M18.12 ARTHRITIS OF CARPOMETACARPAL (CMC) JOINT OF LEFT THUMB: Primary | ICD-10-CM

## 2021-06-08 PROCEDURE — 99213 OFFICE O/P EST LOW 20 MIN: CPT | Performed by: DERMATOLOGY

## 2021-06-08 PROCEDURE — 88305 TISSUE EXAM BY PATHOLOGIST: CPT | Performed by: STUDENT IN AN ORGANIZED HEALTH CARE EDUCATION/TRAINING PROGRAM

## 2021-06-08 PROCEDURE — 88342 IMHCHEM/IMCYTCHM 1ST ANTB: CPT | Performed by: STUDENT IN AN ORGANIZED HEALTH CARE EDUCATION/TRAINING PROGRAM

## 2021-06-08 PROCEDURE — 4004F PT TOBACCO SCREEN RCVD TLK: CPT | Performed by: DERMATOLOGY

## 2021-06-08 PROCEDURE — 73140 X-RAY EXAM OF FINGER(S): CPT

## 2021-06-08 PROCEDURE — 11102 TANGNTL BX SKIN SINGLE LES: CPT | Performed by: DERMATOLOGY

## 2021-06-08 PROCEDURE — 99243 OFF/OP CNSLTJ NEW/EST LOW 30: CPT | Performed by: ORTHOPAEDIC SURGERY

## 2021-06-08 PROCEDURE — 88341 IMHCHEM/IMCYTCHM EA ADD ANTB: CPT | Performed by: STUDENT IN AN ORGANIZED HEALTH CARE EDUCATION/TRAINING PROGRAM

## 2021-06-08 PROCEDURE — 20600 DRAIN/INJ JOINT/BURSA W/O US: CPT | Performed by: ORTHOPAEDIC SURGERY

## 2021-06-08 RX ORDER — LIDOCAINE HYDROCHLORIDE 10 MG/ML
0.5 INJECTION, SOLUTION INFILTRATION; PERINEURAL
Status: COMPLETED | OUTPATIENT
Start: 2021-06-08 | End: 2021-06-08

## 2021-06-08 RX ORDER — TRIAMCINOLONE ACETONIDE 40 MG/ML
20 INJECTION, SUSPENSION INTRA-ARTICULAR; INTRAMUSCULAR
Status: COMPLETED | OUTPATIENT
Start: 2021-06-08 | End: 2021-06-08

## 2021-06-08 RX ADMIN — TRIAMCINOLONE ACETONIDE 20 MG: 40 INJECTION, SUSPENSION INTRA-ARTICULAR; INTRAMUSCULAR at 08:37

## 2021-06-08 RX ADMIN — LIDOCAINE HYDROCHLORIDE 0.5 ML: 10 INJECTION, SOLUTION INFILTRATION; PERINEURAL at 08:37

## 2021-06-08 NOTE — PATIENT INSTRUCTIONS
NEOPLASM OF UNCERTAIN BEHAVIOR OF SKIN    Assessment and Plan:   I have discussed with the patient that a sample of skin via a "skin biopsy would be potentially helpful to further make a specific diagnosis under the microscope   Based on a thorough discussion of this condition and the management approach to it (including a comprehensive discussion of the known risks, side effects and potential benefits of treatment), the patient (family) agrees to implement the following specific plan:    o Procedure:  Skin Biopsy  After a thorough discussion of treatment options and risk/benefits/alternatives (including but not limited to local pain, scarring, dyspigmentation, blistering, possible superinfection, and inability to confirm a diagnosis via histopathology), verbal and written consent were obtained and portion of the rash was biopsied for tissue sample  See below for consent that was obtained from patient and subsequent Procedure Note  INFORMED CONSENT DISCUSSION AND POST-OPERATIVE INSTRUCTIONS FOR PATIENT    I   RATIONALE FOR PROCEDURE  I understand that a skin biopsy allows the Dermatologist to test a lesion or rash under the microscope to obtain a diagnosis  It usually involves numbing the area with numbing medication and removing a small piece of skin; sometimes the area will be closed with sutures  In this specific procedure, sutures are not usually needed  If any sutures are placed, then they are usually need to be removed in 2 weeks or less  I understand that my Dermatologist recommends that a skin "shave" biopsy be performed today  A local anesthetic, similar to the kind that a dentist uses when filling a cavity, will be injected with a very small needle into the skin area to be sampled  The injected skin and tissue underneath "will go to sleep and become numb so no pain should be felt afterwards    An instrument shaped like a tiny "razor blade" (shave biopsy instrument) will be used to cut a small piece of tissue and skin from the area so that a sample of tissue can be taken and examined more closely under the microscope  A slight amount of bleeding will occur, but it will be stopped with direct pressure and a pressure bandage and any other appropriate methods  I understands that a scar will form where the wound was created  Surgical ointment will be applied to help protect the wound  Sutures are not usually needed  II   RISKS AND POTENTIAL COMPLICATIONS   I understand the risks and potential complications of a skin biopsy include but are not limited to the following:   Bleeding   Infection   Pain   Scar/keloid   Skin discoloration   Incomplete Removal   Recurrence   Nerve Damage/Numbness/Loss of Function   Allergic Reaction to Anesthesia   Biopsies are diagnostic procedures and based on findings additional treatment or evaluation may be required   Loss or destruction of specimen resulting in no additional findings    My Dermatologist has explained to me the nature of the condition, the nature of the procedure, and the benefits to be reasonably expected compared with alternative approaches  My Dermatologist has discussed the likelihood of major risks or complications of this procedure including the specific risks listed above, such as bleeding, infection, and scarring/keloid  I understand that a scar is expected after this procedure  I understand that my physician cannot predict if the scar will form a "keloid," which extends beyond the borders of the wound that is created  A keloid is a thick, painful, and bumpy scar  A keloid can be difficult to treat, as it does not always respond well to therapy, which includes injecting cortisone directly into the keloid every few weeks  While this usually reduces the pain and size of the scar, it does not eliminate it  I understand that photographs may be taken before and after the procedure    These will be maintained as part of the medical providers confidential records and may not be made available to me  I further authorize the medical provider to use the photographs for teaching purposes or to illustrate scientific papers, books, or lectures if in his/her judgment, medical research, education, or science may benefit from its use  I have had an opportunity to fully inquire about the risks and benefits of this procedure and its alternatives  I have been given ample time and opportunity to ask questions and to seek a second opinion if I wished to do so  I acknowledge that there have specifically been no guarantees as to the cosmetic results from the procedure  I am aware that with any procedure there is always the possibility of an unexpected complication  III  POST-PROCEDURAL CARE (WHAT YOU WILL NEED TO DO "AFTER THE BIOPSY" TO OPTIMIZE HEALING)     Keep the area clean and dry  Try NOT to remove the bandage or get it wet for the first 24 hours   Gently clean the area and apply surgical ointment (such as Vaseline petrolatum ointment, which is available "over the counter" and not a prescription) to the biopsy site for up to 2 weeks straight  This acts to protect the wound from the outside world   Sutures are not usually placed in this procedure  If any sutures were placed, return for suture removal as instructed (generally 1 week for the face, 2 weeks for the body)   Take Acetaminophen (Tylenol) for discomfort, if no contraindications  Ibuprofen or aspirin could make bleeding worse   Call our office immediately for signs of infection: fever, chills, increased redness, warmth, tenderness, discomfort/pain, or pus or foul smell coming from the wound  WHAT TO DO IF THERE IS ANY BLEEDING? If a small amount of bleeding is noticed, place a clean cloth over the area and apply firm pressure for ten minutes  Check the wound after 10 minutes of direct pressure    If bleeding persists, try one more time for an additional 10 minutes of direct pressure on the area  If the bleeding becomes heavier or does not stop after the second attempt, or if you have any other questions about this procedure, then please call your [facility] Dermatologist by calling [phone] (SKIN)  I hereby acknowledge that I have reviewed and verified the site with my Dermatologist and have requested and authorized my Dermatologist to proceed with the procedure  HISTORY OF MELANOMA    Assessment and Plan:  Based on a thorough discussion of this condition and the management approach to it (including a comprehensive discussion of the known risks, side effects and potential benefits of treatment), the patient (family) agrees to implement the following specific plan:   Recommended physical therapy for right upper back    6 month full skin check     What happens at follow-up? The main purpose of follow-up is to detect recurrences early (metastatic melanoma), but it also offers an opportunity to diagnose a new primary melanoma at the first possible opportunity  A second invasive melanoma occurs in 5-10% of melanoma patients and a new melanoma in situ is diagnosed in more than 20% of melanoma patients  Our practice makes the following recommendations for follow-up for patients with invasive melanoma     At-least "monthly" self-skin examinations    Routine skin checks by a board certified dermatologist   Follow-up intervals are "every 3 months" within 2 years of a new melanoma diagnosis; "every 6 months" between 2-4 years of a new melanoma diagnosis; and "annually" after 4 years of a new melanoma diagnosis   Individual patient's needs should be considered before an appropriate follow-up is offered   Provide education and support to help the patient adjust to their illness    Follow-up appointments should include:   A check of the scar where the primary melanoma was removed   Checking the regional lymph nodes   A general skin examination   A full physical examination at least annually by your primary care physician    In those with more advanced primary disease, follow-up may include:   Blood tests   Imaging: ultrasound, X-ray, CT, MRI and PET scan  Most tests are not worthwhile for patients with stage 1 or 2 melanoma unless there are signs or symptoms of disease recurrence or metastasis  No tests are necessary for healthy patients who have remained well for five years or longer after removal of their melanoma  What is the outlook for patients with melanoma?  Melanoma in situ is cured by excision because it has no potential to spread around the body   The risk of spread and ultimate death from invasive melanoma depends on several factors, but the main one is the Breslow thickness of the melanoma at the time it was surgically removed   Metastases are rare for melanomas < 0 75 mm and the risk for tumours 0 75-1 mm thick is about 5%  The risk steadily increases with thickness so that melanomas > 4 mm have a risk of metastasis of about 40%  Melanoma is a potentially serious type of skin cancer, in which there is uncontrolled growth of melanocytes (pigment cells)  Melanoma is sometimes called malignant melanoma  Normal melanocytes are found in the basal layer of the epidermis (the outer layer of skin)  Melanocytes produce a protein called melanin, which protects skin cells by absorbing ultraviolet (UV) radiation  Melanocytes are found in equal numbers in black and white skin, but melanocytes in black skin produce much more melanin  People with dark brown or black skin are very much less likely to be damaged by UV radiation than those with white skin

## 2021-06-08 NOTE — PROGRESS NOTES
[facility] Follow Up Note    Patient Name: Derrick Hernandez  Encounter Date: 06/08/21    Today's Chief Concerns:  Shanel Courser Concern #1:  Area of concern on right arm     Current Medications:    Current Outpatient Medications:     Aspercreme Lidocaine 4 % PTCH, PLACE 1 PATCH TOPICALLY EVERY DAY AS NEEDED, Disp: , Rfl:     Aspercreme w/Lidocaine 4 % CREA, APPLY 1 APPLICATION TOPICALLY TO AFFECTED AREA 3 TIMES PER DAY, Disp: , Rfl:     Diclofenac Sodium (VOLTAREN) 1 %, APPLY ON THE SKIN FOUR TIMES A DAY AS NEEDED FOR KNEE PAIN, Disp: 200 g, Rfl: 0    gabapentin (NEURONTIN) 300 mg capsule, Take 1 capsule (300 mg total) by mouth 3 (three) times a day, Disp: 90 capsule, Rfl: 0    lisinopril-hydrochlorothiazide (PRINZIDE,ZESTORETIC) 20-25 MG per tablet, TAKE 1 TABLET BY MOUTH DAILY, Disp: 90 tablet, Rfl: 1    pantoprazole (PROTONIX) 40 mg tablet, TAKE 1 TABLET (40 MG TOTAL) BY MOUTH DAILY, Disp: 30 tablet, Rfl: 1    lidocaine (LIDODERM) 5 %, Apply 1 patch topically daily Remove & Discard patch within 12 hours or as directed by MD, Disp: 30 patch, Rfl: 0    oxyCODONE-acetaminophen (PERCOCET) 5-325 mg per tablet, Take 1 tablet by mouth every 6 (six) hours as needed for severe pain for up to 8 dosesMax Daily Amount: 4 tablets (Patient not taking: Reported on 4/19/2021), Disp: 20 tablet, Rfl: 0  No current facility-administered medications for this visit  CONSTITUTIONAL:   Vitals:    06/08/21 1318   Temp: 98 3 °F (36 8 °C)   TempSrc: Tympanic   Weight: (!) 143 kg (314 lb 9 6 oz)   Height: 6' (1 829 m)         Specific Alerts:    Have you been seen by a [facility] Dermatologist in the last 3 years? YES      Allergies   Allergen Reactions    Morphine And Related Vomiting    Nsaids GI Intolerance       May we call your Preferred Phone number to discuss your specific medical information? YES    May we leave a detailed message that includes your specific medical information?  YES    Have you traveled outside of the [facility] [place] in the past 3 months? No    Do you currently have a pacemaker or defibrillator? No    Do you have any artificial heart valves, joints, plates, screws, rods, stents, pins, etc? No   - If Yes, were any placed within the last 2 years? Do you require any medications prior to a surgical procedure? No   -    Are you taking any medications that cause you to bleed more easily ("blood thinners") No    Have you ever experienced a rapid heartbeat with epinephrine? No    Have you ever been treated with "gold" (gold sodium thiomalate) therapy? No    [facility] can help with wrinkles, "laugh lines," facial volume loss, "double chin," "love handles," age spots, and more  Are you interested in learning today about some of the skin enhancement procedures that we offer? (If Yes, please provide more detail) No    Review of Systems:  Have you recently had or currently have any of the following?     · Fever or chills: No  · Night Sweats: No  · Headaches: No  · Weight Gain: No  · Weight Loss: No  · Blurry Vision: No  · Nausea: No  · Vomiting: No  · Diarrhea: No  · Blood in Stool: No  · Abdominal Pain: No  · Itchy Skin: No  · Painful Joints: YES  · Swollen Joints: YES  · Muscle Pain: YES  · Irregular Mole: YES  · Sun Burn: No  · Dry Skin: YES  · Skin Color Changes: No  · Scar or Keloid: YES  · Cold Sores/Fever Blisters: No  · Bacterial Infections/MRSA: No  · Anxiety: No  · Depression: YES  · Suicidal or Homicidal Thoughts: No      PSYCH: Normal mood and affect  EYES: Normal conjunctiva  ENT: Normal lips and oral mucosa  CARDIOVASCULAR: No edema  RESPIRATORY: Normal respirations  HEME/LYMPH/IMMUNO:  No regional lymphadenopathy except as noted below in ASSESSMENT AND PLAN BY DIAGNOSIS    FULL ORGAN SYSTEM SKIN EXAM (SKIN)   Right Arm/Hand/Fingers Normal except as noted below in Assessment       NEOPLASM OF UNCERTAIN BEHAVIOR OF SKIN    Physical Exam:   (Anatomic Location); (Size and Morphological Description); (Differential Diagnosis):  o Specimen A: Right upper arm; skin; shave biopsy; 54year old male with 2 mm papule one red, one brown nevus with hemangioma; differential diagnosis:  rule out melanoma    Pertinent Positives:   Pertinent Negatives: Additional History of Present Condition:  Noticed area recently     Assessment and Plan:   I have discussed with the patient that a sample of skin via a "skin biopsy would be potentially helpful to further make a specific diagnosis under the microscope   Based on a thorough discussion of this condition and the management approach to it (including a comprehensive discussion of the known risks, side effects and potential benefits of treatment), the patient (family) agrees to implement the following specific plan:    o Procedure:  Skin Biopsy  After a thorough discussion of treatment options and risk/benefits/alternatives (including but not limited to local pain, scarring, dyspigmentation, blistering, possible superinfection, and inability to confirm a diagnosis via histopathology), verbal and written consent were obtained and portion of the rash was biopsied for tissue sample  See below for consent that was obtained from patient and subsequent Procedure Note  PROCEDURE SHAVE BIOPSY NOTE:     Performing Physician: Leonela Owens Anatomic Location; Clinical Description with size (cm); Pre-Op Diagnosis:   o Specimen A: Right upper arm; skin; shave biopsy; 54year old male with 2 mm papule one red, one brown nevus with hemangioma; differential diagnosis:  rule out melanoma   o post-op diagnosis: Same      Local anesthesia: 1% xylocaine with epi       Topical anesthesia: None     Hemostasis: Aluminum chloride       After obtaining informed consent  at which time there was a discussion about the purpose of biopsy  and low risks of infection and bleeding  The area was prepped and draped in the usual fashion   Anesthesia was obtained with 1% lidocaine with epinephrine  A shave biopsy to an appropriate sampling depth was obtained with a sterile blade (such as a 15-blade or DermaBlade)  The resulting wound was covered with surgical ointment and bandaged appropriately  The patient tolerated the procedure well without complications and was without signs of functional compromise  Specimen has been sent for review by Dermatopathology  Standard post-procedure care has been explained and has been included in written form within the patient's copy of Informed Consent  INFORMED CONSENT DISCUSSION AND POST-OPERATIVE INSTRUCTIONS FOR PATIENT    I   RATIONALE FOR PROCEDURE  I understand that a skin biopsy allows the Dermatologist to test a lesion or rash under the microscope to obtain a diagnosis  It usually involves numbing the area with numbing medication and removing a small piece of skin; sometimes the area will be closed with sutures  In this specific procedure, sutures are not usually needed  If any sutures are placed, then they are usually need to be removed in 2 weeks or less  I understand that my Dermatologist recommends that a skin "shave" biopsy be performed today  A local anesthetic, similar to the kind that a dentist uses when filling a cavity, will be injected with a very small needle into the skin area to be sampled  The injected skin and tissue underneath "will go to sleep and become numb so no pain should be felt afterwards  An instrument shaped like a tiny "razor blade" (shave biopsy instrument) will be used to cut a small piece of tissue and skin from the area so that a sample of tissue can be taken and examined more closely under the microscope  A slight amount of bleeding will occur, but it will be stopped with direct pressure and a pressure bandage and any other appropriate methods  I understands that a scar will form where the wound was created  Surgical ointment will be applied to help protect the wound    Sutures are not usually needed  II   RISKS AND POTENTIAL COMPLICATIONS   I understand the risks and potential complications of a skin biopsy include but are not limited to the following:   Bleeding   Infection   Pain   Scar/keloid   Skin discoloration   Incomplete Removal   Recurrence   Nerve Damage/Numbness/Loss of Function   Allergic Reaction to Anesthesia   Biopsies are diagnostic procedures and based on findings additional treatment or evaluation may be required   Loss or destruction of specimen resulting in no additional findings    My Dermatologist has explained to me the nature of the condition, the nature of the procedure, and the benefits to be reasonably expected compared with alternative approaches  My Dermatologist has discussed the likelihood of major risks or complications of this procedure including the specific risks listed above, such as bleeding, infection, and scarring/keloid  I understand that a scar is expected after this procedure  I understand that my physician cannot predict if the scar will form a "keloid," which extends beyond the borders of the wound that is created  A keloid is a thick, painful, and bumpy scar  A keloid can be difficult to treat, as it does not always respond well to therapy, which includes injecting cortisone directly into the keloid every few weeks  While this usually reduces the pain and size of the scar, it does not eliminate it  I understand that photographs may be taken before and after the procedure  These will be maintained as part of the medical providers confidential records and may not be made available to me  I further authorize the medical provider to use the photographs for teaching purposes or to illustrate scientific papers, books, or lectures if in his/her judgment, medical research, education, or science may benefit from its use  I have had an opportunity to fully inquire about the risks and benefits of this procedure and its alternatives  I have been given ample time and opportunity to ask questions and to seek a second opinion if I wished to do so  I acknowledge that there have specifically been no guarantees as to the cosmetic results from the procedure  I am aware that with any procedure there is always the possibility of an unexpected complication  III  POST-PROCEDURAL CARE (WHAT YOU WILL NEED TO DO "AFTER THE BIOPSY" TO OPTIMIZE HEALING)     Keep the area clean and dry  Try NOT to remove the bandage or get it wet for the first 24 hours   Gently clean the area and apply surgical ointment (such as Vaseline petrolatum ointment, which is available "over the counter" and not a prescription) to the biopsy site for up to 2 weeks straight  This acts to protect the wound from the outside world   Sutures are not usually placed in this procedure  If any sutures were placed, return for suture removal as instructed (generally 1 week for the face, 2 weeks for the body)   Take Acetaminophen (Tylenol) for discomfort, if no contraindications  Ibuprofen or aspirin could make bleeding worse   Call our office immediately for signs of infection: fever, chills, increased redness, warmth, tenderness, discomfort/pain, or pus or foul smell coming from the wound  WHAT TO DO IF THERE IS ANY BLEEDING? If a small amount of bleeding is noticed, place a clean cloth over the area and apply firm pressure for ten minutes  Check the wound after 10 minutes of direct pressure  If bleeding persists, try one more time for an additional 10 minutes of direct pressure on the area  If the bleeding becomes heavier or does not stop after the second attempt, or if you have any other questions about this procedure, then please call your [facility] by calling [phone] (SKIN)       I hereby acknowledge that I have reviewed and verified the site with my Dermatologist and have requested and authorized my Dermatologist to proceed with the procedure  HISTORY OF MELANOMA    Physical Exam:   Anatomic Location Affected:  Right upper back    Morphological Description of Scar:  12 cm well healed scar    Year Treated: 3/03/2021   TNM Classification: 2 -0 - 0   Suspected Recurrence: no   Regional adenopathy: no    Additional History of Present Condition:  Patient states continues with pain from area of recent procedure     Assessment and Plan:  Based on a thorough discussion of this condition and the management approach to it (including a comprehensive discussion of the known risks, side effects and potential benefits of treatment), the patient (family) agrees to implement the following specific plan:   Recommended physical therapy for right upper back    6 month full skin check     What happens at follow-up? The main purpose of follow-up is to detect recurrences early (metastatic melanoma), but it also offers an opportunity to diagnose a new primary melanoma at the first possible opportunity  A second invasive melanoma occurs in 5-10% of melanoma patients and a new melanoma in situ is diagnosed in more than 20% of melanoma patients  Our practice makes the following recommendations for follow-up for patients with invasive melanoma     At-least "monthly" self-skin examinations    Routine skin checks by a board certified dermatologist   Follow-up intervals are "every 3 months" within 2 years of a new melanoma diagnosis; "every 6 months" between 2-4 years of a new melanoma diagnosis; and "annually" after 4 years of a new melanoma diagnosis   Individual patient's needs should be considered before an appropriate follow-up is offered   Provide education and support to help the patient adjust to their illness    Follow-up appointments should include:   A check of the scar where the primary melanoma was removed   Checking the regional lymph nodes   A general skin examination   A full physical examination at least annually by your primary care physician    In those with more advanced primary disease, follow-up may include:   Blood tests   Imaging: ultrasound, X-ray, CT, MRI and PET scan  Most tests are not worthwhile for patients with stage 1 or 2 melanoma unless there are signs or symptoms of disease recurrence or metastasis  No tests are necessary for healthy patients who have remained well for five years or longer after removal of their melanoma  What is the outlook for patients with melanoma?  Melanoma in situ is cured by excision because it has no potential to spread around the body   The risk of spread and ultimate death from invasive melanoma depends on several factors, but the main one is the Breslow thickness of the melanoma at the time it was surgically removed   Metastases are rare for melanomas < 0 75 mm and the risk for tumours 0 75-1 mm thick is about 5%  The risk steadily increases with thickness so that melanomas > 4 mm have a risk of metastasis of about 40%  Melanoma is a potentially serious type of skin cancer, in which there is uncontrolled growth of melanocytes (pigment cells)  Melanoma is sometimes called malignant melanoma  Normal melanocytes are found in the basal layer of the epidermis (the outer layer of skin)  Melanocytes produce a protein called melanin, which protects skin cells by absorbing ultraviolet (UV) radiation  Melanocytes are found in equal numbers in black and white skin, but melanocytes in black skin produce much more melanin  People with dark brown or black skin are very much less likely to be damaged by UV radiation than those with white skin        Scribe Attestation    I,:  Alexa Bradshaw am acting as a scribe while in the presence of the attending physician :       I,:  Chuy Jo MD personally performed the services described in this documentation    as scribed in my presence :
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
## 2021-06-16 ENCOUNTER — OFFICE VISIT (OUTPATIENT)
Dept: GASTROENTEROLOGY | Facility: CLINIC | Age: 56
End: 2021-06-16
Payer: COMMERCIAL

## 2021-06-16 VITALS
WEIGHT: 311 LBS | BODY MASS INDEX: 42.12 KG/M2 | HEART RATE: 74 BPM | SYSTOLIC BLOOD PRESSURE: 132 MMHG | HEIGHT: 72 IN | DIASTOLIC BLOOD PRESSURE: 60 MMHG | TEMPERATURE: 96.6 F

## 2021-06-16 DIAGNOSIS — R13.10 DYSPHAGIA, UNSPECIFIED TYPE: ICD-10-CM

## 2021-06-16 DIAGNOSIS — Z86.010 HISTORY OF COLON POLYPS: ICD-10-CM

## 2021-06-16 DIAGNOSIS — K21.9 GASTROESOPHAGEAL REFLUX DISEASE WITHOUT ESOPHAGITIS: Primary | ICD-10-CM

## 2021-06-16 DIAGNOSIS — K29.80 DUODENITIS: ICD-10-CM

## 2021-06-16 PROBLEM — Z86.0100 HISTORY OF COLON POLYPS: Status: ACTIVE | Noted: 2021-06-16

## 2021-06-16 PROCEDURE — 3008F BODY MASS INDEX DOCD: CPT | Performed by: DERMATOLOGY

## 2021-06-16 PROCEDURE — 99214 OFFICE O/P EST MOD 30 MIN: CPT | Performed by: PHYSICIAN ASSISTANT

## 2021-06-16 RX ORDER — PANTOPRAZOLE SODIUM 40 MG/1
40 TABLET, DELAYED RELEASE ORAL 2 TIMES DAILY
Qty: 60 TABLET | Refills: 2 | Status: SHIPPED | OUTPATIENT
Start: 2021-06-16 | End: 2021-06-17 | Stop reason: SDUPTHER

## 2021-06-16 NOTE — ASSESSMENT & PLAN NOTE
Patient had 8 adenomatous polyps removed at the time of colonoscopy in December 2020, which was relatively poorly prepped    He does endorse some chronic constipation with having bowel movements about once every 4 days     - plan for colonoscopy at the end of this year as previously discussed     - recommended patient take fiber supplementation daily rather than on an as needed basis     -can also take MiraLax as needed for constipation
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
## 2021-06-16 NOTE — ASSESSMENT & PLAN NOTE
Patient still endorses discomfort and globus sensation with swallowing, and sometimes not with swallowing; differential includes functional globus, esophageal hypercontractility / dysmotility,  Reflux esophagitis  He had EGD last year showing no stricture, mass or esophageal ring     -  Will increase PPI therapy to twice daily     -will check barium esophagram, patient did have a video / modified barium swallow so he should continue follow-up with speech therapy and continue dietary recommendations that were made at that time;   Will assess the mid to distal esophagus with the standard barium esophagram     - pending patient's clinical course and barium swallow results, can also consider manometry for further workup    - follow-up in a few months

## 2021-06-16 NOTE — RESULT ENCOUNTER NOTE
Tissue Exam: [id number]  Order: [id number]  Status:  Final result   Visible to patient:  Yes ([address]) Next appt:  08/26/2021 at 03:00 PM in Neurology ([address]) Dx:  Neoplasm of uncertain behavior of skin  0 Result Notes  Component   Case Report  Surgical Pathology Report                         Case: [id number]                                   Authorizing Provider: Jill Killian MD      Collected:           06/08/2021 [facility]              Ordering Location:     [facility]      Received:            06/08/2021 [address] [place] [address]                                                                   Pathologist:           Glory Batista MD                                                           Specimen:    Skin, Other, Specimen A: Right upper arm                                                 Final Diagnosis  A  Skin, right upper arm, shave biopsy:     Lentiginous junctional nevus with moderate atypia; focally close to deep specimen margin (see note)      Note: SOX10 and MART-1 immunostains were performed and support the diagnosis  Multiple levels examined         Electronically signed by Glory Batista MD on 6/12/2021 at 11:42 AM        DERMATOPATHOLOGY RESULT NOTE    Results reviewed by ordering physician  Called patient to personally discuss results  No answer, left voicemail with result        Instructions for Clinical Derm Team:   (remember to route Result Note to appropriate staff):    Call patient and schedule for an excision of the area    Result & Plan by Specimen:    Specimen A: indeterminate  Plan: excision

## 2021-06-24 ENCOUNTER — HOSPITAL ENCOUNTER (OUTPATIENT)
Dept: RADIOLOGY | Facility: HOSPITAL | Age: 56
Discharge: HOME | End: 2021-06-24
Payer: COMMERCIAL

## 2021-06-24 DIAGNOSIS — K21.9 GASTROESOPHAGEAL REFLUX DISEASE WITHOUT ESOPHAGITIS: ICD-10-CM

## 2021-06-24 DIAGNOSIS — R13.10 DYSPHAGIA, UNSPECIFIED TYPE: ICD-10-CM

## 2021-06-24 PROCEDURE — 74220 X-RAY XM ESOPHAGUS 1CNTRST: CPT

## 2021-07-01 ENCOUNTER — HOSPITAL ENCOUNTER (OUTPATIENT)
Facility: AMBULARY SURGERY CENTER | Age: 56
Setting detail: OUTPATIENT SURGERY
Discharge: HOME | End: 2021-07-01
Attending: ANESTHESIOLOGY | Admitting: ANESTHESIOLOGY
Payer: COMMERCIAL

## 2021-07-01 ENCOUNTER — APPOINTMENT (OUTPATIENT)
Dept: RADIOLOGY | Facility: HOSPITAL | Age: 56
End: 2021-07-01
Payer: COMMERCIAL

## 2021-07-01 VITALS
HEIGHT: 72 IN | RESPIRATION RATE: 20 BRPM | SYSTOLIC BLOOD PRESSURE: 105 MMHG | BODY MASS INDEX: 42.12 KG/M2 | TEMPERATURE: 97.5 F | OXYGEN SATURATION: 99 % | WEIGHT: 311 LBS | HEART RATE: 79 BPM | DIASTOLIC BLOOD PRESSURE: 79 MMHG

## 2021-07-01 PROCEDURE — 72020 X-RAY EXAM OF SPINE 1 VIEW: CPT

## 2021-07-01 PROCEDURE — 62321 NJX INTERLAMINAR CRV/THRC: CPT | Performed by: ANESTHESIOLOGY

## 2021-07-01 RX ORDER — LIDOCAINE WITH 8.4% SOD BICARB 0.9%(10ML)
SYRINGE (ML) INJECTION AS NEEDED
Status: DISCONTINUED | OUTPATIENT
Start: 2021-07-01 | End: 2021-07-01 | Stop reason: HOSPADM

## 2021-07-01 RX ORDER — METHYLPREDNISOLONE ACETATE 80 MG/ML
INJECTION, SUSPENSION INTRA-ARTICULAR; INTRALESIONAL; INTRAMUSCULAR; SOFT TISSUE AS NEEDED
Status: DISCONTINUED | OUTPATIENT
Start: 2021-07-01 | End: 2021-07-01 | Stop reason: HOSPADM

## 2021-07-01 NOTE — OP NOTE
ATTENDING PHYSICIAN:  Quentin Thorpe MD     PROCEDURE:  Cervical epidural steroid injection with steroid and local anesthetic under fluoroscopy at the C6-C7 level  PREPROCEDURE DIAGNOSIS:  Neck pain and upper extremity radicular symptoms  POSTPROCEDURE DIAGNOSIS:  Neck pain and upper extremity radicular symptoms  ANESTHESIA:  Local     ESTIMATED BLOOD LOSS:  Minimal     COMPLICATIONS:  None  LOCATION:  [address]  CONSENT:  Today's procedure, its potential benefits as well as its risks and potential side effects were reviewed  Discussed risks of the procedure including bleeding, infection, nerve irritation or damage, reactions to the medications, headache, failure of the pain to improve, and potential worsening of the pain were explained to the patient who verbalized understanding and who wished to proceed  Written informed consent is thereby obtained  DESCRIPTION OF THE PROCEDURE:  After written informed consent was obtained, the patient was taken to the fluoroscopy suite and placed in the prone position  Anatomical landmarks were identified by way of fluoroscopy in multiple views  The skin of the cervical region was prepped and draped in the usual sterile fashion  Strict aseptic technique was utilized  The skin and subcutaneous tissues at the needle entry site were infiltrated with 3 mL of 1% preservative-free lidocaine using a 25-gauge 1-1/2-inch needle  A 20-gauge Tuohy needle was then incrementally advanced under fluoroscopy using a loss of resistance technique  Upon entering into the epidural space, a positive loss of resistance to air was noted and a characteristic "pop" was felt  Proper placement into the epidural space was confirmed with a hanging column technique where preservative-free normal saline was noted to flow freely to gravity in to the epidural space as well as by the administration of contrast to delineate the epidural space   There were no paresthesias reported  After negative aspiration for CSF or heme, a 6 mL injectate consisting of 1 mL of Depo-Medrol 80 mg/mL and 1 mL of Depo-Medrol 40 mg/mL mixed with 4 mL of preservative-free normal saline was slowly injected  The patient tolerated the procedure well and all needles were removed with the tips intact  Hemostasis was maintained  There were no apparent paresthesias or complications  The skin was wiped clean and a Band-Aid was placed as appropriate  The patient was monitored for an appropriate period of time following the procedure and remained hemodynamically stable and neurovascularly intact following the procedure  The patient was ultimately discharged to home with supervision in good condition and instructed to call the office in a few days for an update or sooner as warranted  I was present for and participated in all key and critical portions of this procedure      Sean Jones MD  7/1/2021  1:32 PM

## 2021-07-01 NOTE — PERIOPERATIVE NURSING NOTE
Patient to remain at [place] [id number] for 1 hour post procedure because he does not have a , per Dr Marcela Whitney

## 2021-07-01 NOTE — DISCHARGE INSTRUCTIONS
Epidural Steroid Injection   WHAT YOU NEED TO KNOW:   An epidural steroid injection (CAROLYN) is a procedure to inject steroid medicine into the epidural space  The epidural space is between your spinal cord and vertebrae  Steroids reduce inflammation and fluid buildup in your spine that may be causing pain  You may be given pain medicine along with the steroids  ACTIVITY  · Do not drive or operate machinery today  · No strenuous activity today - bending, lifting, etc   · You may resume normal activites starting tomorrow - start slowly and as tolerated  · You may shower today, but no tub baths or hot tubs  · You may have numbness for several hours from the local anesthetic  Please use caution and common sense, especially with weight-bearing activities  CARE OF THE INJECTION SITE  · If you have soreness or pain, apply ice to the area today (20 minutes on/20 minutes off)  · Starting tomorrow, you may use warm, moist heat or ice if needed  · You may have an increase or change in your discomfort for 36-48 hours after your treatment  · Apply ice and continue with any pain medication you have been prescribed  · Notify the [facility] if you have any of the following: redness, drainage, swelling, headache, stiff neck or fever above 100°F     SPECIAL INSTRUCTIONS  · Our office will contact you in approximately 7 days for a progress report  MEDICATIONS  · Continue to take all routine medications  · Our office may have instructed you to hold some medications  As no general anesthesia was used in today's procedure, you should not experience any side effects related to anesthesia  If you have a problem specifically related to your procedure, please call our office at [phone]  Problems not related to your procedure should be directed to your primary care physician

## 2021-07-01 NOTE — INTERVAL H&P NOTE
H&P reviewed  After examining the patient I find no changes in the patients condition since the H&P had been written      Vitals:    07/01/21 1305   BP: 102/81   Pulse: 78   Resp: 18   Temp: 97 5 °F (36 4 °C)   SpO2: 98%

## 2021-07-08 ENCOUNTER — TELEPHONE (OUTPATIENT)
Dept: PAIN MEDICINE | Facility: CLINIC | Age: 56
End: 2021-07-08

## 2021-07-13 DIAGNOSIS — M25.569 KNEE PAIN, UNSPECIFIED CHRONICITY, UNSPECIFIED LATERALITY: ICD-10-CM

## 2021-07-21 ENCOUNTER — PROCEDURE VISIT (OUTPATIENT)
Dept: DERMATOLOGY | Age: 56
End: 2021-07-21
Payer: COMMERCIAL

## 2021-07-21 VITALS — TEMPERATURE: 97.9 F

## 2021-07-21 DIAGNOSIS — D48.5 NEOPLASM OF UNCERTAIN BEHAVIOR OF SKIN: Primary | ICD-10-CM

## 2021-07-21 PROCEDURE — 88305 TISSUE EXAM BY PATHOLOGIST: CPT | Performed by: STUDENT IN AN ORGANIZED HEALTH CARE EDUCATION/TRAINING PROGRAM

## 2021-07-21 PROCEDURE — 12032 INTMD RPR S/A/T/EXT 2.6-7.5: CPT | Performed by: DERMATOLOGY

## 2021-07-21 PROCEDURE — 11403 EXC TR-EXT B9+MARG 2.1-3CM: CPT | Performed by: DERMATOLOGY

## 2021-07-21 NOTE — PATIENT INSTRUCTIONS
PROCEDURE:  EXCISION WITH INTERMEDIATE LAYERED CLOSURE       Written and verbal, witnessed informed consent was obtained  I discussed that excision is a method of removing lesions both benign and malignant lesions  A portion of normal skin is often taken to ensure completeness of removal   I reviewed that procedure will include numbing the area,  cutting around and under defect, undermining tissue, and closing the wound with sutures both inside and out  These sutures are usually removed in 7 to 14 days  Risks (bleeding, pain, infection, scarring, recurrence) and benefits discussed  It was discussed with patient that every effort is made to minimize scar, but scarring is influenced also by extrinsic factor such as location, age and genetics  DESCRIPTION OF PROCEDURE: The patient was brought back into the procedure room, anesthetized locally, prepped and draped in the usual fashion  Using a #15 blade with a scalpel, the lesion was excised in elliptical fashion  The wound was  undermined in the  fascial plane  Hemostasis was achieved with light electrocoagulation  Purpose of undermining was to decrease wound tension and facilitate closure  The wound was closed with subcutaneous sutures as follows:    Deep suture:4-0 Vicryl      Epidermal edge closure was accomplished with superficial sutures as follows:    Superficial suture: 4-0 Ethilon  Superficial suture type: Interrupted     Estimated blood loss less than 3ml  The patient tolerated the procedure well without any complications  The wound was cleaned with sterile saline, dried off, surgical vaseline ointment was applied, and the wound was covered  A pressure dressing was applied for stabilization and light pressure  The patient was given detailed oral and written instructions on postoperative care as detailed in consent  The patient left in good medical condition      POSTOP DISCUSSION DISCUSSION AND INSTRUCTIONS FOR PATIENT      Rationale for Procedure  A skin excision allows the dermatologist to remove a lesion  The procedure involves a local numbing medication and removing the entire lesion  Typically, the lesion is being removed because it is atypical, traumatized, or for significant appearance reasons  The area will be open like a brush burn and allowed to heal    There will be no sutures  Tissue is sent to pathologist who will reconfirm diagnosis and verify completeness of lesion removal     Description of Procedure  We would like to perform a skin excision today  A local anesthetic, similar to the kind that a dentist uses when filling a cavity, will be injected with a very small needle into the skin area to be sampled  The injected skin and tissue underneath should go to sleep and become numbed so that no further pain should be felt  A scalpel will be used to cut around the lesion and tissue will be submitted to pathology for examination  Depending on the diagnosis the lesion will be excised with a certain amount of normal skin to help assure completeness of lesion removal   The physician will discuss in advance the anticipated size and extent of removal    Bleeding will occur, but it will stopped with direct pressure, sutures, and electrocautery  Surgical Vaseline-type ointment will also applied after the procedure to help create a barrier between the wound and the outside world  Risks and Potential Complications  The advantage of a skin excision is that it allows us to remove a problem lesion quickly  Although this usually permits the lesion to heal as soon as possible with the least scarring, there are some risks and potential complications that include but are not limited to the following:  - Some bleeding is normal at time of procedure and some bleeding on gauze is normal  the first few days after surgery    Profuse bleeding and bleeding with swelling and pain should be reported as detailed  below  - Infection is uncommon in skin surgery  Infection should be reported and is indicated by pain, redness, and discharge of purulent material   - Some dull to at time sharp pain could occur initially the day after surgery  Persistent pain or increasing pain days after surgery is not expected and should be reported  - Every effort is made to minimize scar, but location, size, and genetics do play a role in scar appearance  A surgical wound does not achieve its optimal appearance until 6 months  There are several treatments available if scarring would be problematic including scar creams, silicone pad, laser and scar revision   - Skin discoloration can occur especially in people of color  Its important to avoid sun on wound in first 6 months after surgery  Treatment is available if pigment is problematic   - Incomplete removal of the lesion or recurrence of lesion can occur and this would then require further treatment and more surgery   - Nerve Damage/Numbness/Loss of Function is very rare, but is most likely to occur if lesion is large or if it is in a high risk location  - Allergic Reaction to lidocaine is rare  More commonly,  epinephrine is used  with the lidocaine  Occasionally, epinephrine (aka adrenalin) may cause a brief  feeling of anxiety or jitteriness  - The person at the microscope  (pathologist) may provide additional information that was unexpected  This unexpected finding could provoke the need for additional treatment or evaluation  What You Will Need to Do After the Procedure  1  Keep the area clean and dry the first day  Try NOT to remove the bandage for the first day  2  Gently clean the area with soap and water and apply Vaseline ointment (this is over the counter and not a prescription) to the excision  site for up to 2 weeks  3  Apply a clean appropriately sized bandage to area  Gauze and paper tape are recommended for sensitive skin    4  Return for suture removal as instructed (generally 1 week for the face, 2 weeks for the body)  5  Take Acetaminophen (Tylenol) for discomfort, if no contraindications  Do NOT take Ibuprofen or aspirin unless specifically told to do so by your Dermatologist because these medications can make bleeding worse  6  Call our office immediately for signs of infection: fever, chills, increased redness, warmth, tenderness, discomfort/pain, or pus or foul smell coming from the wound  If bleeding is noticed, place a clean cloth over the area and apply firm pressure for thirty minutes  Check the wound ONLY after 30 minutes of direct pressure; do not cheat and sneak a peak, as that does not count  If bleeding persists after 30 minutes of legitimate direct pressure, then try one more round of direct pressure for an additional 10 minutes to the area  Should the bleeding become heavier or not stop after the second attempt, call [facility] directly at [phone] (SKIN) or, if after hours, go to your local Emergency Room/Emergency Department  LIPOMA    Assessment and Plan:  Based on a thorough discussion of this condition and the management approach to it (including a comprehensive discussion of the known risks, side effects and potential benefits of treatment), the patient (family) agrees to implement the following specific plan:   Schedule for excision  Lipoma  A lipoma is a non-cancerous tumor that is made up of fat cells  It slowly grows under the skin in the subcutaneous tissue  A person may have a single lipoma or may have many lipomas  They are very common  Lipomas can occur in people of all ages, however, they tend to develop in adulthood and are most noticeable during middle age  They affect both sexes equally, although solitary lipomas are more common in women whilst multiple lipomas occur more frequently in men  The cause of lipomas is unknown   It is possible there may be genetic involvement as many patients with lipomas come from a family with a history of these tumors  Sometimes an injury such as a blunt blow to part of the body may trigger growth of a lipoma  People are often unaware of lipomas until they have grown large enough to become visible and palpable  This growth occurs slowly over several years  Some features of lipomas include:   A dome-shaped or egg-shaped lump about 2-10 cm in diameter (some may grow even larger)    It feels soft and smooth and is easily moved under the skin with the fingers    Some have a rubbery or doughy consistency    They are most common on the shoulders, neck, trunk and arms, but they can occur anywhere on the body where fat tissue is present  Most lipomas are symptomless, but some are painful on applying pressure  Lipomas that are tender or painful are usually angiolipomas  This means the lipoma has an increased number of small blood vessels  Painful lipomas are also a feature of adiposis dolorosa or Dercum disease  Diagnosis of lipoma is usually made clinically by finding a soft lump under the skin  However, if there is any doubt, a deep skin biopsy can be performed which will show typical histopathological features of lipoma and its variants  Most lipomas require no treatment  Most lipomas eventually stop growing and remain indefinitely without causing any problems   Occasionally, lipomas that interfere with the movement of adjacent muscles may require surgical removal  Several methods are available:   Simple surgical excision    Squeeze technique (a small incision is made over the lipoma and the fatty tissue is squeezed through the hole)    Liposuction

## 2021-07-21 NOTE — PROGRESS NOTES
PROCEDURE:  EXCISION WITH INTERMEDIATE LAYERED CLOSURE     Attending: Mary Matson  Assistant: Caroline Romero    Pre-Op Diagnosis: lentiginous junctional nevus with moderate atypia - in patient with history of melanoma  Post-Op Diagnosis: Same   Benign versus Malignant benign      Lesion Anatomic Location: right upper arm (Previous Accession Number: [id number])  Pre-op size: 2 cm x 2 cm  Size of defect:  3 cm (with 2 centimeter margins)  Final repaired wound length:  3 cm    Written and verbal, witnessed informed consent was obtained  I discussed that excision is a method of removing lesions both benign and malignant lesions  A portion of normal skin is often taken to ensure completeness of removal   I reviewed that procedure will include numbing the area,  cutting around and under defect, undermining tissue, and closing the wound with sutures both inside and out  These sutures are usually removed in 7 to 14 days  Risks (bleeding, pain, infection, scarring, recurrence) and benefits discussed  It was discussed with patient that every effort is made to minimize scar, but scarring is influenced also by extrinsic factor such as location, age and genetics  Time Out: performed:  yes  Correct patient: yes  Correct site per Clinic Report: yes  Correct site per Patient Report: yes    LOCAL ANESTHESIA: 1% xylocaine with epi     DESCRIPTION OF PROCEDURE: The patient was brought back into the procedure room, anesthetized locally, prepped and draped in the usual fashion  Using a #15 blade with a scalpel, the lesion was excised in elliptical fashion  The wound was  undermined in the  fascial plane  Hemostasis was achieved with light electrocoagulation  Purpose of undermining was to decrease wound tension and facilitate closure      The wound was closed with subcutaneous sutures as follows:    Deep suture:4-0 Vicryl      Epidermal edge closure was accomplished with superficial sutures as follows:    Superficial suture: 4-0 Ethilon  Superficial suture type: Interrupted     Estimated blood loss less than 3ml  The patient tolerated the procedure well without any complications  The wound was cleaned with sterile saline, dried off, surgical vaseline ointment was applied, and the wound was covered  A pressure dressing was applied for stabilization and light pressure  The patient was given detailed oral and written instructions on postoperative care as detailed in consent  The patient left in good medical condition  POSTOP DISCUSSION DISCUSSION AND INSTRUCTIONS FOR PATIENT      Rationale for Procedure  A skin excision allows the dermatologist to remove a lesion  The procedure involves a local numbing medication and removing the entire lesion  Typically, the lesion is being removed because it is atypical, traumatized, or for significant appearance reasons  The area will be open like a brush burn and allowed to heal    There will be no sutures  Tissue is sent to pathologist who will reconfirm diagnosis and verify completeness of lesion removal     Description of Procedure  We would like to perform a skin excision today  A local anesthetic, similar to the kind that a dentist uses when filling a cavity, will be injected with a very small needle into the skin area to be sampled  The injected skin and tissue underneath should go to sleep and become numbed so that no further pain should be felt  A scalpel will be used to cut around the lesion and tissue will be submitted to pathology for examination  Depending on the diagnosis the lesion will be excised with a certain amount of normal skin to help assure completeness of lesion removal   The physician will discuss in advance the anticipated size and extent of removal    Bleeding will occur, but it will stopped with direct pressure, sutures, and electrocautery      Surgical Vaseline-type ointment will also applied after the procedure to help create a barrier between the wound and the outside world  Risks and Potential Complications  The advantage of a skin excision is that it allows us to remove a problem lesion quickly  Although this usually permits the lesion to heal as soon as possible with the least scarring, there are some risks and potential complications that include but are not limited to the following:  - Some bleeding is normal at time of procedure and some bleeding on gauze is normal  the first few days after surgery  Profuse bleeding and bleeding with swelling and pain should be reported as detailed  below  - Infection is uncommon in skin surgery  Infection should be reported and is indicated by pain, redness, and discharge of purulent material   - Some dull to at time sharp pain could occur initially the day after surgery  Persistent pain or increasing pain days after surgery is not expected and should be reported  - Every effort is made to minimize scar, but location, size, and genetics do play a role in scar appearance  A surgical wound does not achieve its optimal appearance until 6 months  There are several treatments available if scarring would be problematic including scar creams, silicone pad, laser and scar revision   - Skin discoloration can occur especially in people of color  Its important to avoid sun on wound in first 6 months after surgery  Treatment is available if pigment is problematic   - Incomplete removal of the lesion or recurrence of lesion can occur and this would then require further treatment and more surgery   - Nerve Damage/Numbness/Loss of Function is very rare, but is most likely to occur if lesion is large or if it is in a high risk location  - Allergic Reaction to lidocaine is rare  More commonly,  epinephrine is used  with the lidocaine  Occasionally, epinephrine (aka adrenalin) may cause a brief  feeling of anxiety or jitteriness  - The person at the microscope  (pathologist) may provide additional information that was unexpected  This unexpected finding could provoke the need for additional treatment or evaluation  What You Will Need to Do After the Procedure  1  Keep the area clean and dry the first day  Try NOT to remove the bandage for the first day  2  Gently clean the area with soap and water and apply Vaseline ointment (this is over the counter and not a prescription) to the excision  site for up to 2 weeks  3  Apply a clean appropriately sized bandage to area  Gauze and paper tape are recommended for sensitive skin  4  Return for suture removal as instructed (generally 1 week for the face, 2 weeks for the body)  5  Take Acetaminophen (Tylenol) for discomfort, if no contraindications  Do NOT take Ibuprofen or aspirin unless specifically told to do so by your Dermatologist because these medications can make bleeding worse  6  Call our office immediately for signs of infection: fever, chills, increased redness, warmth, tenderness, discomfort/pain, or pus or foul smell coming from the wound  If bleeding is noticed, place a clean cloth over the area and apply firm pressure for thirty minutes  Check the wound ONLY after 30 minutes of direct pressure; do not cheat and sneak a peak, as that does not count  If bleeding persists after 30 minutes of legitimate direct pressure, then try one more round of direct pressure for an additional 10 minutes to the area  Should the bleeding become heavier or not stop after the second attempt, call [facility] Dermatology directly at [phone] (SKIN) or, if after hours, go to your local Emergency Room/Emergency Department  LIPOMA    Physical Exam:   Anatomic Location Affected:  Right posterior thigh   Morphological Description:  4 cm subcutaneous nodule   Pertinent Positives:   Pertinent Negatives:     Additional History of Present Condition:  Patient stated lesion is painful at times    Assessment and Plan:  Based on a thorough discussion of this condition and the management approach to it (including a comprehensive discussion of the known risks, side effects and potential benefits of treatment), the patient (family) agrees to implement the following specific plan:   Schedule for excision  Lipoma  A lipoma is a non-cancerous tumor that is made up of fat cells  It slowly grows under the skin in the subcutaneous tissue  A person may have a single lipoma or may have many lipomas  They are very common  Lipomas can occur in people of all ages, however, they tend to develop in adulthood and are most noticeable during middle age  They affect both sexes equally, although solitary lipomas are more common in women whilst multiple lipomas occur more frequently in men  The cause of lipomas is unknown  It is possible there may be genetic involvement as many patients with lipomas come from a family with a history of these tumors  Sometimes an injury such as a blunt blow to part of the body may trigger growth of a lipoma  People are often unaware of lipomas until they have grown large enough to become visible and palpable  This growth occurs slowly over several years  Some features of lipomas include:   A dome-shaped or egg-shaped lump about 2-10 cm in diameter (some may grow even larger)    It feels soft and smooth and is easily moved under the skin with the fingers    Some have a rubbery or doughy consistency    They are most common on the shoulders, neck, trunk and arms, but they can occur anywhere on the body where fat tissue is present  Most lipomas are symptomless, but some are painful on applying pressure  Lipomas that are tender or painful are usually angiolipomas  This means the lipoma has an increased number of small blood vessels  Painful lipomas are also a feature of adiposis dolorosa or Dercum disease  Diagnosis of lipoma is usually made clinically by finding a soft lump under the skin   However, if there is any doubt, a deep skin biopsy can be performed which will show typical histopathological features of lipoma and its variants  Most lipomas require no treatment  Most lipomas eventually stop growing and remain indefinitely without causing any problems   Occasionally, lipomas that interfere with the movement of adjacent muscles may require surgical removal  Several methods are available:   Simple surgical excision    Squeeze technique (a small incision is made over the lipoma and the fatty tissue is squeezed through the hole)    Liposuction    Scribe Attestation    I,:  Kemar Shall am acting as a scribe while in the presence of the attending physician :       I,:  Fariba Flanagan MD personally performed the services described in this documentation    as scribed in my presence :

## 2021-07-26 NOTE — RESULT ENCOUNTER NOTE
Tissue Exam: [id number]  Order: [id number]  Status:  Final result   Visible to patient:  Yes ([address]) Next appt:  07/29/2021 at 11:00 AM in Pain Medicine ([facility]) Dx:  Neoplasm of uncertain behavior of skin  0 Result Notes  Component   Case Report  Surgical Pathology Report                         Case: [id number]                                   Authorizing Provider: Elise Silva MD      Collected:           07/21/2021 [facility]              Ordering Location:     [facility]      Received:            07/21/2021 [address] [place] [address]                                                                   Pathologist:           Jessenia Hart MD                                                           Specimen:    Skin, Other, specimen A: Right Upper Arm                                                 Final Diagnosis  A  Skin, right upper arm, excision:     -Prior procedure site changes present      -Residual atypical nevus not seen         Electronically signed by Jessenia Hart MD on 7/26/2021 at 11:06 AM    DERMATOPATHOLOGY RESULT NOTE    Results reviewed by ordering physician  Called patient to personally discuss results  No answer, left voicemail with result  Instructions for Clinical Derm Team:   (remember to route Result Note to appropriate staff):    None    Result & Plan by Specimen:    Specimen A: margins clear, no residual nevus  Plan: continue with skin checks every 6 months

## 2021-07-29 ENCOUNTER — OFFICE VISIT (OUTPATIENT)
Dept: PAIN MEDICINE | Facility: CLINIC | Age: 56
End: 2021-07-29
Payer: COMMERCIAL

## 2021-07-29 VITALS
WEIGHT: 311 LBS | BODY MASS INDEX: 42.12 KG/M2 | HEIGHT: 72 IN | HEART RATE: 94 BPM | DIASTOLIC BLOOD PRESSURE: 94 MMHG | SYSTOLIC BLOOD PRESSURE: 139 MMHG

## 2021-07-29 DIAGNOSIS — M25.562 PAIN IN BOTH KNEES, UNSPECIFIED CHRONICITY: ICD-10-CM

## 2021-07-29 DIAGNOSIS — G89.4 CHRONIC PAIN SYNDROME: Primary | ICD-10-CM

## 2021-07-29 DIAGNOSIS — M48.02 CERVICAL SPINAL STENOSIS: ICD-10-CM

## 2021-07-29 DIAGNOSIS — M54.42 BILATERAL LOW BACK PAIN WITH BILATERAL SCIATICA, UNSPECIFIED CHRONICITY: ICD-10-CM

## 2021-07-29 DIAGNOSIS — M54.41 BILATERAL LOW BACK PAIN WITH BILATERAL SCIATICA, UNSPECIFIED CHRONICITY: ICD-10-CM

## 2021-07-29 DIAGNOSIS — M54.2 NECK PAIN: ICD-10-CM

## 2021-07-29 DIAGNOSIS — M54.12 CERVICAL RADICULOPATHY: ICD-10-CM

## 2021-07-29 DIAGNOSIS — M25.561 PAIN IN BOTH KNEES, UNSPECIFIED CHRONICITY: ICD-10-CM

## 2021-07-29 PROCEDURE — 99214 OFFICE O/P EST MOD 30 MIN: CPT | Performed by: NURSE PRACTITIONER

## 2021-07-29 PROCEDURE — 3075F SYST BP GE 130 - 139MM HG: CPT | Performed by: NURSE PRACTITIONER

## 2021-07-29 PROCEDURE — 3008F BODY MASS INDEX DOCD: CPT | Performed by: NURSE PRACTITIONER

## 2021-07-29 RX ORDER — GABAPENTIN 300 MG/1
300 CAPSULE ORAL 3 TIMES DAILY
Qty: 90 CAPSULE | Refills: 1 | Status: SHIPPED | OUTPATIENT
Start: 2021-07-29 | End: 2021-08-20 | Stop reason: SDUPTHER
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
## 2021-08-04 ENCOUNTER — OFFICE VISIT (OUTPATIENT)
Dept: DERMATOLOGY | Age: 56
End: 2021-08-04

## 2021-08-04 DIAGNOSIS — Z48.02 ENCOUNTER FOR REMOVAL OF SUTURES: Primary | ICD-10-CM

## 2021-08-04 PROCEDURE — RECHECK: Performed by: DERMATOLOGY

## 2021-08-04 NOTE — PROGRESS NOTES
Suture removal    Date/Time: 8/4/2021 12:33 PM  Performed by: Lars Aguilar  Authorized by: Litzy Carlos MD   Universal Protocol:  Consent: Verbal consent obtained  Written consent not obtained  Risks and benefits: risks, benefits and alternatives were discussed  Consent given by: patient  Timeout called at: 8/4/2021 12:33 PM   Patient understanding: patient states understanding of the procedure being performed  Patient consent: the patient's understanding of the procedure matches consent given  Procedure consent: procedure consent matches procedure scheduled  Relevant documents: relevant documents not present or verified  Test results: test results not available  Site marked: the operative site was not marked  Radiology Images displayed and confirmed  If images not available, report reviewed: imaging studies not available  Patient identity confirmed: verbally with patient        Patient location:  Clinic  Location:     Laterality:  Right    Location:  Upper extremity    Upper extremity location:  Arm    Arm location:  R upper arm  Procedure details: Tools used:  Suture removal kit    Wound appearance:  No sign(s) of infection, good wound healing and clean    Number of sutures removed:  6  Post-procedure details:     Post-removal:  Antibiotic ointment applied and Band-Aid applied    Patient tolerance of procedure:   Tolerated well, no immediate complications        Scribe Attestation    I,:  Lars Aguilar am acting as a scribe while in the presence of the attending physician :       I,:  Litzy Carlos MD personally performed the services described in this documentation    as scribed in my presence :

## 2021-08-04 NOTE — PATIENT INSTRUCTIONS
Suture removal completed today in office with no complications  Follow up in 3 months for full body check

## 2021-08-20 ENCOUNTER — TELEPHONE (OUTPATIENT)
Dept: PAIN MEDICINE | Facility: CLINIC | Age: 56
End: 2021-08-20

## 2021-08-20 NOTE — TELEPHONE ENCOUNTER
Pt says he cannot get into PT until 9/14  Pt says he should be in therapy already  He lives in [place] [address] and that is what is available near him and where they accept his insurance  Pt is frustrated and says this is impacting him mentally and physically   Ph# [phone]

## 2021-08-20 NOTE — TELEPHONE ENCOUNTER
S/w pt, states he is unable to be scheduled for PT any sooner than mid sept d/t insurance reasons and waiting lists at the PT facilities covered  Pt states he is very frustrated, he has been taking gabapentin but notes new pins and needles sensation in pinky finger x 1 week  Pt states his pain continues, and overall he has had other life stressors that seem to be exacerbating frustration and pain  Any further recommendations for pt at this time? Please advise, thank you

## 2021-08-20 NOTE — TELEPHONE ENCOUNTER
S/w pt, advised of the same  Pt states he is agreeable to increase gabapentin, advised pt of titration schedule, pt verbalized understanding  Pt requesting script be sent to [facility] in [place]  Thank you  Pt states he is also interested in repeating LUBA one more time  Pt states this is the last injection he would like to try

## 2021-08-20 NOTE — TELEPHONE ENCOUNTER
The patient did not wish to repeat the cervical epidural steroid injection at the time of our last office visit  If he would like to do that now we can schedule him for that, and we can also increase his gabapentin from 300 mg 3 times a day to 600 mg 3 times a day  If he decides to increase his gabapentin, please have him do so 1 dose at a time for 1-2 days so he can see how he responds  If he chooses to increase his dose, please let me know so I can send in a new prescription  If he decides he wants to move forward with cervical epidural steroid injection, please let Bj Krishnan know

## 2021-08-23 DIAGNOSIS — G89.4 CHRONIC PAIN SYNDROME: ICD-10-CM

## 2021-08-23 DIAGNOSIS — M54.2 NECK PAIN: ICD-10-CM

## 2021-08-23 DIAGNOSIS — M54.12 CERVICAL RADICULOPATHY: ICD-10-CM

## 2021-08-23 DIAGNOSIS — M48.02 CERVICAL SPINAL STENOSIS: ICD-10-CM

## 2021-08-23 RX ORDER — GABAPENTIN 300 MG/1
600 CAPSULE ORAL 3 TIMES DAILY
Qty: 120 CAPSULE | Refills: 2 | Status: SHIPPED | OUTPATIENT
Start: 2021-08-23 | End: 2021-08-24 | Stop reason: SDUPTHER

## 2021-08-23 NOTE — TELEPHONE ENCOUNTER
Pt is aware that his medications were sent to the pharmacy      Scheduled patient for 9/9/21  Patient denies RX blood thinners/ NSAIDS  Nothing to eat or drink 1 hour prior to procedure  Needs to arrange transportation  Proper clothing for procedure  No vaccines 2 weeks prior or after procedure  If ill or place on antibiotics, please call to reschedule    Pt will wait to have the COVID booster vaccine until 2 weeks after his procedure

## 2021-08-24 DIAGNOSIS — G89.4 CHRONIC PAIN SYNDROME: ICD-10-CM

## 2021-08-24 DIAGNOSIS — M54.2 NECK PAIN: ICD-10-CM

## 2021-08-24 DIAGNOSIS — M54.12 CERVICAL RADICULOPATHY: ICD-10-CM

## 2021-08-24 DIAGNOSIS — K29.80 DUODENITIS: ICD-10-CM

## 2021-08-24 DIAGNOSIS — M48.02 CERVICAL SPINAL STENOSIS: ICD-10-CM

## 2021-08-24 RX ORDER — GABAPENTIN 300 MG/1
600 CAPSULE ORAL 3 TIMES DAILY
Qty: 120 CAPSULE | Refills: 2 | Status: SHIPPED | OUTPATIENT
Start: 2021-08-24

## 2021-08-24 RX ORDER — PANTOPRAZOLE SODIUM 40 MG/1
40 TABLET, DELAYED RELEASE ORAL 2 TIMES DAILY
Qty: 60 TABLET | Refills: 2 | Status: SHIPPED | OUTPATIENT
Start: 2021-08-24

## 2021-08-26 ENCOUNTER — HOSPITAL ENCOUNTER (OUTPATIENT)
Dept: NEUROLOGY | Facility: HOSPITAL | Age: 56
Discharge: HOME | End: 2021-08-26
Attending: PSYCHIATRY & NEUROLOGY
Payer: COMMERCIAL

## 2021-08-26 DIAGNOSIS — M79.601 RIGHT ARM PAIN: ICD-10-CM

## 2021-08-26 DIAGNOSIS — M54.2 NECK PAIN: ICD-10-CM

## 2021-08-26 DIAGNOSIS — M54.12 CERVICAL RADICULOPATHY: ICD-10-CM

## 2021-08-26 PROCEDURE — 95911 NRV CNDJ TEST 9-10 STUDIES: CPT | Performed by: PSYCHIATRY & NEUROLOGY

## 2021-08-26 PROCEDURE — 95886 MUSC TEST DONE W/N TEST COMP: CPT | Performed by: PSYCHIATRY & NEUROLOGY

## 2021-08-31 ENCOUNTER — OFFICE VISIT (OUTPATIENT)
Dept: NEUROLOGY | Facility: CLINIC | Age: 56
End: 2021-08-31
Payer: COMMERCIAL

## 2021-08-31 VITALS
WEIGHT: 311 LBS | DIASTOLIC BLOOD PRESSURE: 74 MMHG | SYSTOLIC BLOOD PRESSURE: 120 MMHG | HEIGHT: 72 IN | HEART RATE: 86 BPM | BODY MASS INDEX: 42.12 KG/M2

## 2021-08-31 DIAGNOSIS — M50.90 CERVICAL DISC DISEASE: ICD-10-CM

## 2021-08-31 DIAGNOSIS — G56.03 BILATERAL CARPAL TUNNEL SYNDROME: Primary | ICD-10-CM

## 2021-08-31 PROCEDURE — 99214 OFFICE O/P EST MOD 30 MIN: CPT | Performed by: PSYCHIATRY & NEUROLOGY
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
## 2021-09-01 ENCOUNTER — PROCEDURE VISIT (OUTPATIENT)
Dept: DERMATOLOGY | Age: 56
End: 2021-09-01
Payer: COMMERCIAL

## 2021-09-01 VITALS — TEMPERATURE: 97.5 F | BODY MASS INDEX: 42.26 KG/M2 | WEIGHT: 312 LBS | HEIGHT: 72 IN

## 2021-09-01 DIAGNOSIS — D17.9 LIPOMA, UNSPECIFIED SITE: Primary | ICD-10-CM

## 2021-09-01 PROCEDURE — 88304 TISSUE EXAM BY PATHOLOGIST: CPT | Performed by: STUDENT IN AN ORGANIZED HEALTH CARE EDUCATION/TRAINING PROGRAM

## 2021-09-01 PROCEDURE — 12032 INTMD RPR S/A/T/EXT 2.6-7.5: CPT | Performed by: DERMATOLOGY

## 2021-09-01 PROCEDURE — 11403 EXC TR-EXT B9+MARG 2.1-3CM: CPT | Performed by: DERMATOLOGY

## 2021-09-01 NOTE — PROGRESS NOTES
PROCEDURE:  EXCISION WITH INTERMEDIATE LAYERED CLOSURE     Attending: Chantal Aj  Assistant: Cheikh Ball    Pre-Op Diagnosis: lipoma  Post-Op Diagnosis: Same   Benign versus Malignant bengin      Lesion Anatomic Location: Right posterior thigh   Size of defect:  3cm  X 2 cm (with 3cm centimeter margins)  Final repaired wound length:  4 cm  0 5cm    Written and verbal, witnessed informed consent was obtained  I discussed that excision is a method of removing lesions both benign and malignant lesions  A portion of normal skin is often taken to ensure completeness of removal   I reviewed that procedure will include numbing the area,  cutting around and under defect, undermining tissue, and closing the wound with sutures both inside and out  These sutures are usually removed in 7 to 14 days  Risks (bleeding, pain, infection, scarring, recurrence) and benefits discussed  It was discussed with patient that every effort is made to minimize scar, but scarring is influenced also by extrinsic factor such as location, age and genetics  Time Out: performed:  yes  Correct patient: yes  Correct site per Clinic Report: yes  Correct site per Patient Report: yes    LOCAL ANESTHESIA: 1% xylocaine with epi     DESCRIPTION OF PROCEDURE: The patient was brought back into the procedure room, anesthetized locally, prepped and draped in the usual fashion  Using a #15 blade with a scalpel, the lesion was excised in elliptical fashion  The wound was  undermined in the  fascial plane  Hemostasis was achieved with light electrocoagulation  Purpose of undermining was to decrease wound tension and facilitate closure  The wound was closed with subcutaneous sutures as follows:    Deep suture:4-0 Vicryl      Epidermal edge closure was accomplished with superficial sutures as follows:    Superficial suture: 4-0 Ethilon  Superficial suture type: Interrupted     Estimated blood loss less than 3ml      The patient tolerated the procedure well without any complications  The wound was cleaned with sterile saline, dried off, surgical vaseline ointment was applied, and the wound was covered  A pressure dressing was applied for stabilization and light pressure  The patient was given detailed oral and written instructions on postoperative care as detailed in consent  The patient left in good medical condition  POSTOP DISCUSSION DISCUSSION AND INSTRUCTIONS FOR PATIENT      Rationale for Procedure  A skin excision allows the dermatologist to remove a lesion  The procedure involves a local numbing medication and removing the entire lesion  Typically, the lesion is being removed because it is atypical, traumatized, or for significant appearance reasons  The area will be open like a brush burn and allowed to heal    There will be no sutures  Tissue is sent to pathologist who will reconfirm diagnosis and verify completeness of lesion removal     Description of Procedure  We would like to perform a skin excision today  A local anesthetic, similar to the kind that a dentist uses when filling a cavity, will be injected with a very small needle into the skin area to be sampled  The injected skin and tissue underneath should go to sleep and become numbed so that no further pain should be felt  A scalpel will be used to cut around the lesion and tissue will be submitted to pathology for examination  Depending on the diagnosis the lesion will be excised with a certain amount of normal skin to help assure completeness of lesion removal   The physician will discuss in advance the anticipated size and extent of removal    Bleeding will occur, but it will stopped with direct pressure, sutures, and electrocautery  Surgical Vaseline-type ointment will also applied after the procedure to help create a barrier between the wound and the outside world        Risks and Potential Complications  The advantage of a skin excision is that it allows us to remove a problem lesion quickly  Although this usually permits the lesion to heal as soon as possible with the least scarring, there are some risks and potential complications that include but are not limited to the following:  - Some bleeding is normal at time of procedure and some bleeding on gauze is normal  the first few days after surgery  Profuse bleeding and bleeding with swelling and pain should be reported as detailed  below  - Infection is uncommon in skin surgery  Infection should be reported and is indicated by pain, redness, and discharge of purulent material   - Some dull to at time sharp pain could occur initially the day after surgery  Persistent pain or increasing pain days after surgery is not expected and should be reported  - Every effort is made to minimize scar, but location, size, and genetics do play a role in scar appearance  A surgical wound does not achieve its optimal appearance until 6 months  There are several treatments available if scarring would be problematic including scar creams, silicone pad, laser and scar revision   - Skin discoloration can occur especially in people of color  Its important to avoid sun on wound in first 6 months after surgery  Treatment is available if pigment is problematic   - Incomplete removal of the lesion or recurrence of lesion can occur and this would then require further treatment and more surgery   - Nerve Damage/Numbness/Loss of Function is very rare, but is most likely to occur if lesion is large or if it is in a high risk location  - Allergic Reaction to lidocaine is rare  More commonly,  epinephrine is used  with the lidocaine  Occasionally, epinephrine (aka adrenalin) may cause a brief  feeling of anxiety or jitteriness  - The person at the microscope  (pathologist) may provide additional information that was unexpected  This unexpected finding could provoke the need for additional treatment or evaluation      What You Will Need to Do After the Procedure  1  Keep the area clean and dry the first day  Try NOT to remove the bandage for the first day  2  Gently clean the area with soap and water and apply Vaseline ointment (this is over the counter and not a prescription) to the excision  site for up to 2 weeks  3  Apply a clean appropriately sized bandage to area  Gauze and paper tape are recommended for sensitive skin  4  Return for suture removal as instructed (generally 1 week for the face, 2 weeks for the body)  5  Take Acetaminophen (Tylenol) for discomfort, if no contraindications  Do NOT take Ibuprofen or aspirin unless specifically told to do so by your Dermatologist because these medications can make bleeding worse  6  Call our office immediately for signs of infection: fever, chills, increased redness, warmth, tenderness, discomfort/pain, or pus or foul smell coming from the wound  If bleeding is noticed, place a clean cloth over the area and apply firm pressure for thirty minutes  Check the wound ONLY after 30 minutes of direct pressure; do not cheat and sneak a peak, as that does not count  If bleeding persists after 30 minutes of legitimate direct pressure, then try one more round of direct pressure for an additional 10 minutes to the area  Should the bleeding become heavier or not stop after the second attempt, call [facility] directly at [phone] (SKIN) or, if after hours, go to your local Emergency Room/Emergency Department        Scribe Attestation    I,:  Amy Cano am acting as a scribe while in the presence of the attending physician :       I,:  Harshad Nails MD personally performed the services described in this documentation    as scribed in my presence :

## 2021-09-01 NOTE — PATIENT INSTRUCTIONS
POSTOP DISCUSSION DISCUSSION AND INSTRUCTIONS FOR PATIENT      Rationale for Procedure  A skin excision allows the dermatologist to remove a lesion  The procedure involves a local numbing medication and removing the entire lesion  Typically, the lesion is being removed because it is atypical, traumatized, or for significant appearance reasons  The area will be open like a brush burn and allowed to heal    There will be no sutures  Tissue is sent to pathologist who will reconfirm diagnosis and verify completeness of lesion removal     Description of Procedure  We would like to perform a skin excision today  A local anesthetic, similar to the kind that a dentist uses when filling a cavity, will be injected with a very small needle into the skin area to be sampled  The injected skin and tissue underneath should go to sleep and become numbed so that no further pain should be felt  A scalpel will be used to cut around the lesion and tissue will be submitted to pathology for examination  Depending on the diagnosis the lesion will be excised with a certain amount of normal skin to help assure completeness of lesion removal   The physician will discuss in advance the anticipated size and extent of removal    Bleeding will occur, but it will stopped with direct pressure, sutures, and electrocautery  Surgical Vaseline-type ointment will also applied after the procedure to help create a barrier between the wound and the outside world  Risks and Potential Complications  The advantage of a skin excision is that it allows us to remove a problem lesion quickly  Although this usually permits the lesion to heal as soon as possible with the least scarring, there are some risks and potential complications that include but are not limited to the following:  - Some bleeding is normal at time of procedure and some bleeding on gauze is normal  the first few days after surgery    Profuse bleeding and bleeding with swelling and pain should be reported as detailed  below  - Infection is uncommon in skin surgery  Infection should be reported and is indicated by pain, redness, and discharge of purulent material   - Some dull to at time sharp pain could occur initially the day after surgery  Persistent pain or increasing pain days after surgery is not expected and should be reported  - Every effort is made to minimize scar, but location, size, and genetics do play a role in scar appearance  A surgical wound does not achieve its optimal appearance until 6 months  There are several treatments available if scarring would be problematic including scar creams, silicone pad, laser and scar revision   - Skin discoloration can occur especially in people of color  Its important to avoid sun on wound in first 6 months after surgery  Treatment is available if pigment is problematic   - Incomplete removal of the lesion or recurrence of lesion can occur and this would then require further treatment and more surgery   - Nerve Damage/Numbness/Loss of Function is very rare, but is most likely to occur if lesion is large or if it is in a high risk location  - Allergic Reaction to lidocaine is rare  More commonly,  epinephrine is used  with the lidocaine  Occasionally, epinephrine (aka adrenalin) may cause a brief  feeling of anxiety or jitteriness  - The person at the microscope  (pathologist) may provide additional information that was unexpected  This unexpected finding could provoke the need for additional treatment or evaluation  What You Will Need to Do After the Procedure  1  Keep the area clean and dry the first day  Try NOT to remove the bandage for the first day  2  Gently clean the area with soap and water and apply Vaseline ointment (this is over the counter and not a prescription) to the excision  site for up to 2 weeks  3  Apply a clean appropriately sized bandage to area    Gauze and paper tape are recommended for sensitive skin   4  Return for suture removal as instructed (generally 1 week for the face, 2 weeks for the body)  5  Take Acetaminophen (Tylenol) for discomfort, if no contraindications  Do NOT take Ibuprofen or aspirin unless specifically told to do so by your Dermatologist because these medications can make bleeding worse  6  Call our office immediately for signs of infection: fever, chills, increased redness, warmth, tenderness, discomfort/pain, or pus or foul smell coming from the wound  If bleeding is noticed, place a clean cloth over the area and apply firm pressure for thirty minutes  Check the wound ONLY after 30 minutes of direct pressure; do not cheat and sneak a peak, as that does not count  If bleeding persists after 30 minutes of legitimate direct pressure, then try one more round of direct pressure for an additional 10 minutes to the area  Should the bleeding become heavier or not stop after the second attempt, call [facility] directly at [phone] (SKIN) or, if after hours, go to your local Emergency Room/Emergency Department

## 2021-09-03 ENCOUNTER — OFFICE VISIT (OUTPATIENT)
Dept: OBGYN CLINIC | Facility: CLINIC | Age: 56
End: 2021-09-03
Payer: COMMERCIAL

## 2021-09-03 VITALS
DIASTOLIC BLOOD PRESSURE: 66 MMHG | HEIGHT: 72 IN | HEART RATE: 82 BPM | SYSTOLIC BLOOD PRESSURE: 99 MMHG | BODY MASS INDEX: 42.26 KG/M2 | WEIGHT: 312 LBS

## 2021-09-03 DIAGNOSIS — M25.562 CHRONIC PAIN OF LEFT KNEE: ICD-10-CM

## 2021-09-03 DIAGNOSIS — M17.12 PRIMARY OSTEOARTHRITIS OF LEFT KNEE: Primary | ICD-10-CM

## 2021-09-03 DIAGNOSIS — G89.29 CHRONIC PAIN OF LEFT KNEE: ICD-10-CM

## 2021-09-03 PROCEDURE — 3078F DIAST BP <80 MM HG: CPT | Performed by: ORTHOPAEDIC SURGERY

## 2021-09-03 PROCEDURE — 3008F BODY MASS INDEX DOCD: CPT | Performed by: ORTHOPAEDIC SURGERY

## 2021-09-03 PROCEDURE — 99214 OFFICE O/P EST MOD 30 MIN: CPT | Performed by: ORTHOPAEDIC SURGERY

## 2021-09-03 PROCEDURE — 3074F SYST BP LT 130 MM HG: CPT | Performed by: ORTHOPAEDIC SURGERY
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
## 2021-09-07 ENCOUNTER — TELEPHONE (OUTPATIENT)
Dept: OBGYN CLINIC | Facility: HOSPITAL | Age: 56
End: 2021-09-07

## 2021-09-07 NOTE — RESULT ENCOUNTER NOTE
Tissue Exam: [id number]  Order: [id number]  Status:  Final result   Visible to patient:  Yes ([address]) Next appt:  09/14/2021 at 09:45 AM in Orthopedic Surgery [facility] Dx:  Lipoma, unspecified site  0 Result Notes  Component   Case Report  Surgical Pathology Report                         Case: [id number]                                   Authorizing Provider: Kenneth Huber MD      Collected:           09/01/2021 [facility]              Ordering Location:     [facility]      Received:            09/01/2021 [address] [place] [address]                                                                   Pathologist:           Tish Vivar MD                                                           Specimen:    Soft Tissue, Lipoma, right posterior thigh                                               Final Diagnosis  A  Skin, right posterior thigh, excision:     Ruptured EPIDERMAL INCLUSION CYST with foreign body giant cell reaction to keratinous debris    Electronically signed by Tish Vivar MD on 9/7/2021 at  4:55 PM      Patient notified via my chart

## 2021-09-08 NOTE — TELEPHONE ENCOUNTER
I called patient and advised that he should see Dr Joceline Buckley  We cancelled his appointment with Dr Dione Dutta in December and rescheduled him with Dr Joceline Buckley in late September

## 2021-09-09 ENCOUNTER — TELEPHONE (OUTPATIENT)
Dept: OBGYN CLINIC | Facility: CLINIC | Age: 56
End: 2021-09-09

## 2021-09-09 ENCOUNTER — APPOINTMENT (OUTPATIENT)
Dept: RADIOLOGY | Facility: HOSPITAL | Age: 56
End: 2021-09-09
Payer: COMMERCIAL

## 2021-09-09 ENCOUNTER — HOSPITAL ENCOUNTER (OUTPATIENT)
Facility: AMBULARY SURGERY CENTER | Age: 56
Setting detail: OUTPATIENT SURGERY
Discharge: HOME | End: 2021-09-09
Attending: ANESTHESIOLOGY | Admitting: ANESTHESIOLOGY
Payer: COMMERCIAL

## 2021-09-09 VITALS
SYSTOLIC BLOOD PRESSURE: 137 MMHG | OXYGEN SATURATION: 98 % | TEMPERATURE: 98.6 F | RESPIRATION RATE: 18 BRPM | HEART RATE: 73 BPM | DIASTOLIC BLOOD PRESSURE: 67 MMHG

## 2021-09-09 PROCEDURE — 72020 X-RAY EXAM OF SPINE 1 VIEW: CPT

## 2021-09-09 PROCEDURE — 62321 NJX INTERLAMINAR CRV/THRC: CPT | Performed by: ANESTHESIOLOGY

## 2021-09-09 RX ORDER — LIDOCAINE WITH 8.4% SOD BICARB 0.9%(10ML)
SYRINGE (ML) INJECTION AS NEEDED
Status: DISCONTINUED | OUTPATIENT
Start: 2021-09-09 | End: 2021-09-09 | Stop reason: HOSPADM

## 2021-09-09 RX ORDER — METHYLPREDNISOLONE ACETATE 80 MG/ML
INJECTION, SUSPENSION INTRA-ARTICULAR; INTRALESIONAL; INTRAMUSCULAR; SOFT TISSUE AS NEEDED
Status: DISCONTINUED | OUTPATIENT
Start: 2021-09-09 | End: 2021-09-09 | Stop reason: HOSPADM

## 2021-09-09 NOTE — DISCHARGE INSTRUCTIONS
Steroid Joint Injection   WHAT YOU NEED TO KNOW:   A steroid joint injection is a procedure to inject steroid medicine into a joint  Steroid medicine decreases pain and inflammation  The injection may also contain an anesthetic (numbing medicine) to decrease pain  It may be done to treat conditions such as arthritis, gout, or carpal tunnel syndrome  The injections may be given in your knee, ankle, shoulder, elbow, or wrist  Injections may also be given in your hip, toe, thumb, or finger  DISCHARGE INSTRUCTIONS:   Contact your healthcare provider if:   · You have fever or chills  · You have redness or swelling at the injection site  · You have more pain than usual in your joint for more than 72 hours  · You have questions or concerns about your condition or care  Medicines:   · Pain medicine  may be given  Ask how to take this medicine safely  · Take your medicine as directed  Contact your healthcare provider if you think your medicine is not helping or if you have side effects  Tell him or her if you are allergic to any medicine  Keep a list of the medicines, vitamins, and herbs you take  Include the amounts, and when and why you take them  Bring the list or the pill bottles to follow-up visits  Carry your medicine list with you in case of an emergency  Self-care:   · Leave the bandage on for 8 to 12 hours  Care for your wound as directed  · Rest the area  as directed  You may need to decrease weight on certain joints, such as the knee, for a period of time  Ask when you can return to your daily activities  · Elevate  your limb where the steroid injection was given  Elevate the limb above the level of your heart as often as you can  This will help decrease swelling and pain  Prop your limb on pillows or blankets to keep it elevated comfortably  · Apply ice  on your joint for 15 to 20 minutes every hour or as directed  Use an ice pack, or put crushed ice in a plastic bag   Cover it with a towel  Ice helps prevent tissue damage and decreases swelling and pain  © Copyright [employer] 2021 Information is for End User's use only and may not be sold, redistributed or otherwise used for commercial purposes  All illustrations and images included in CareNotes® are the copyrighted property of A D A M [employer]  or [employer]  The above information is an  only  It is not intended as medical advice for individual conditions or treatments  Talk to your doctor, nurse or pharmacist before following any medical regimen to see if it is safe and effective for you

## 2021-09-09 NOTE — OP NOTE
ATTENDING PHYSICIAN:  Roxana Barkley MD     PROCEDURE:  Cervical epidural steroid injection with steroid and local anesthetic under fluoroscopy at the C6-C7 level  PREPROCEDURE DIAGNOSIS:  Neck pain  POSTPROCEDURE DIAGNOSIS:  Neck pain  ANESTHESIA:  Local     ESTIMATED BLOOD LOSS:  Minimal     COMPLICATIONS:  None  LOCATION:  [address]  CONSENT:  Today's procedure, its potential benefits as well as its risks and potential side effects were reviewed  Discussed risks of the procedure including bleeding, infection, nerve irritation or damage, reactions to the medications, headache, failure of the pain to improve, and potential worsening of the pain were explained to the patient who verbalized understanding and who wished to proceed  Written informed consent is thereby obtained  DESCRIPTION OF THE PROCEDURE:  After written informed consent was obtained, the patient was taken to the fluoroscopy suite and placed in the prone position  Anatomical landmarks were identified by way of fluoroscopy in multiple views  The skin of the cervical region was prepped and draped in the usual sterile fashion  Strict aseptic technique was utilized  The skin and subcutaneous tissues at the needle entry site were infiltrated with 3 mL of 1% preservative-free lidocaine using a 25-gauge 1-1/2-inch needle  A 20-gauge Tuohy needle was then incrementally advanced under fluoroscopy using a loss of resistance technique  Upon entering into the epidural space, a positive loss of resistance to air was noted and a characteristic "pop" was felt  Proper placement into the epidural space was confirmed with a hanging column technique where preservative-free normal saline was noted to flow freely to gravity in to the epidural space as well as by the administration of contrast to delineate the epidural space  There were no paresthesias reported   After negative aspiration for CSF or heme, a 6 mL injectate consisting of 1 mL of Depo-Medrol 80 mg/mL and 1 mL of Depo-Medrol 40 mg/mL mixed with 4 mL of preservative-free normal saline was slowly injected  The patient tolerated the procedure well and all needles were removed with the tips intact  Hemostasis was maintained  There were no apparent paresthesias or complications  The skin was wiped clean and a Band-Aid was placed as appropriate  The patient was monitored for an appropriate period of time following the procedure and remained hemodynamically stable and neurovascularly intact following the procedure  The patient was ultimately discharged to home with supervision in good condition and instructed to call the office in a few days for an update or sooner as warranted  I was present for and participated in all key and critical portions of this procedure      Frnacisco Hernandes MD  9/9/2021  2:00 PM

## 2021-09-09 NOTE — TELEPHONE ENCOUNTER
Patient called and lvm  11:39 that he was running late   Not sure who to fwd this information to for his procedure
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
## 2021-09-16 ENCOUNTER — TELEPHONE (OUTPATIENT)
Dept: PAIN MEDICINE | Facility: CLINIC | Age: 56
End: 2021-09-16

## 2021-09-20 DIAGNOSIS — M25.569 KNEE PAIN, UNSPECIFIED CHRONICITY, UNSPECIFIED LATERALITY: ICD-10-CM

## 2021-09-23 ENCOUNTER — OFFICE VISIT (OUTPATIENT)
Dept: DERMATOLOGY | Age: 56
End: 2021-09-23

## 2021-09-23 ENCOUNTER — OFFICE VISIT (OUTPATIENT)
Dept: OBGYN CLINIC | Facility: CLINIC | Age: 56
End: 2021-09-23
Payer: COMMERCIAL

## 2021-09-23 VITALS
DIASTOLIC BLOOD PRESSURE: 74 MMHG | BODY MASS INDEX: 41.72 KG/M2 | HEIGHT: 72 IN | HEART RATE: 75 BPM | WEIGHT: 308 LBS | SYSTOLIC BLOOD PRESSURE: 110 MMHG

## 2021-09-23 DIAGNOSIS — F17.200 CURRENT SMOKER: ICD-10-CM

## 2021-09-23 DIAGNOSIS — D17.23 LIPOMA OF RIGHT LOWER EXTREMITY: Primary | ICD-10-CM

## 2021-09-23 DIAGNOSIS — G89.29 CHRONIC PAIN OF BOTH KNEES: ICD-10-CM

## 2021-09-23 DIAGNOSIS — M17.0 PRIMARY OSTEOARTHRITIS OF KNEES, BILATERAL: Primary | ICD-10-CM

## 2021-09-23 DIAGNOSIS — M25.562 CHRONIC PAIN OF BOTH KNEES: ICD-10-CM

## 2021-09-23 DIAGNOSIS — M25.561 CHRONIC PAIN OF BOTH KNEES: ICD-10-CM

## 2021-09-23 PROCEDURE — RECHECK: Performed by: DERMATOLOGY

## 2021-09-23 PROCEDURE — 3078F DIAST BP <80 MM HG: CPT | Performed by: ORTHOPAEDIC SURGERY

## 2021-09-23 PROCEDURE — 99214 OFFICE O/P EST MOD 30 MIN: CPT | Performed by: ORTHOPAEDIC SURGERY

## 2021-09-23 PROCEDURE — 3074F SYST BP LT 130 MM HG: CPT | Performed by: ORTHOPAEDIC SURGERY

## 2021-09-23 RX ORDER — ERGOCALCIFEROL (VITAMIN D2) 1250 MCG
50000 CAPSULE ORAL
COMMUNITY
Start: 2021-09-26 | End: 2021-12-25

## 2021-09-23 NOTE — PROGRESS NOTES
Suture removal    Date/Time: 9/23/2021 10:52 AM  Performed by: Carla Ray  Authorized by: Litzy Carlos MD   Universal Protocol:  Procedure performed by: (Carla Ray)  Consent: Verbal consent obtained  Consent given by: patient  Patient understanding: patient states understanding of the procedure being performed  Patient consent: the patient's understanding of the procedure matches consent given  Procedure consent: procedure consent matches procedure scheduled  Relevant documents: relevant documents present and verified  Test results: test results available and properly labeled  Site marked: the operative site was marked      Patient location:  Clinic  Location:     Laterality:  Right    Location:  Lower extremity    Lower extremity location: thigh  Procedure details: Tools used:  Suture removal kit    Wound appearance:  No sign(s) of infection, good wound healing and pink    Number of sutures removed:  3  Post-procedure details:     Post-removal:  Antibiotic ointment applied and dressing applied    Patient tolerance of procedure:   Tolerated well, no immediate complications      Scribe Attestation    I,:  Carla Ray am acting as a scribe while in the presence of the attending physician :       I,:  Litzy Carlos MD personally performed the services described in this documentation    as scribed in my presence :
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
## 2021-09-28 ENCOUNTER — APPOINTMENT (OUTPATIENT)
Dept: RADIOLOGY | Facility: CLINIC | Age: 56
End: 2021-09-28
Payer: COMMERCIAL

## 2021-09-28 VITALS — HEIGHT: 72 IN | WEIGHT: 307 LBS | BODY MASS INDEX: 41.58 KG/M2

## 2021-09-28 DIAGNOSIS — M18.12 ARTHRITIS OF CARPOMETACARPAL (CMC) JOINT OF LEFT THUMB: Primary | ICD-10-CM

## 2021-09-28 DIAGNOSIS — M79.644 PAIN OF RIGHT THUMB: ICD-10-CM

## 2021-09-28 DIAGNOSIS — G56.03 BILATERAL CARPAL TUNNEL SYNDROME: ICD-10-CM

## 2021-09-28 PROCEDURE — 3008F BODY MASS INDEX DOCD: CPT | Performed by: ORTHOPAEDIC SURGERY

## 2021-09-28 PROCEDURE — 99213 OFFICE O/P EST LOW 20 MIN: CPT | Performed by: ORTHOPAEDIC SURGERY

## 2021-09-28 PROCEDURE — 20600 DRAIN/INJ JOINT/BURSA W/O US: CPT | Performed by: ORTHOPAEDIC SURGERY

## 2021-09-28 PROCEDURE — 20526 THER INJECTION CARP TUNNEL: CPT | Performed by: ORTHOPAEDIC SURGERY

## 2021-09-28 PROCEDURE — 73140 X-RAY EXAM OF FINGER(S): CPT

## 2021-09-28 RX ORDER — TRIAMCINOLONE ACETONIDE 40 MG/ML
20 INJECTION, SUSPENSION INTRA-ARTICULAR; INTRAMUSCULAR
Status: COMPLETED | OUTPATIENT
Start: 2021-09-28 | End: 2021-09-28

## 2021-09-28 RX ORDER — LIDOCAINE HYDROCHLORIDE 10 MG/ML
0.5 INJECTION, SOLUTION INFILTRATION; PERINEURAL
Status: COMPLETED | OUTPATIENT
Start: 2021-09-28 | End: 2021-09-28

## 2021-09-28 RX ADMIN — LIDOCAINE HYDROCHLORIDE 0.5 ML: 10 INJECTION, SOLUTION INFILTRATION; PERINEURAL at 13:42

## 2021-09-28 RX ADMIN — TRIAMCINOLONE ACETONIDE 20 MG: 40 INJECTION, SUSPENSION INTRA-ARTICULAR; INTRAMUSCULAR at 13:34

## 2021-09-28 RX ADMIN — TRIAMCINOLONE ACETONIDE 20 MG: 40 INJECTION, SUSPENSION INTRA-ARTICULAR; INTRAMUSCULAR at 13:42

## 2021-09-28 RX ADMIN — LIDOCAINE HYDROCHLORIDE 0.5 ML: 10 INJECTION, SOLUTION INFILTRATION; PERINEURAL at 13:34
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
## 2021-11-12 ENCOUNTER — TELEPHONE (OUTPATIENT)
Dept: OBGYN CLINIC | Facility: HOSPITAL | Age: 56
End: 2021-11-12

## 2021-11-23 NOTE — TELEPHONE ENCOUNTER
Patient would like to know if he can lose 25 pounds by Dec 7th if he can have the surgery? He would like a call at [phone]  Not applicable

## 2021-12-01 ENCOUNTER — HOSPITAL ENCOUNTER (EMERGENCY)
Facility: HOSPITAL | Age: 56
Discharge: HOME | End: 2021-12-01
Attending: EMERGENCY MEDICINE | Admitting: EMERGENCY MEDICINE
Payer: COMMERCIAL

## 2021-12-01 ENCOUNTER — OFFICE VISIT (OUTPATIENT)
Dept: OBGYN CLINIC | Facility: CLINIC | Age: 56
End: 2021-12-01
Payer: COMMERCIAL

## 2021-12-01 VITALS — BODY MASS INDEX: 42.66 KG/M2 | HEIGHT: 72 IN | WEIGHT: 315 LBS

## 2021-12-01 VITALS
HEART RATE: 90 BPM | RESPIRATION RATE: 18 BRPM | DIASTOLIC BLOOD PRESSURE: 64 MMHG | OXYGEN SATURATION: 95 % | TEMPERATURE: 98.6 F | SYSTOLIC BLOOD PRESSURE: 137 MMHG

## 2021-12-01 DIAGNOSIS — M17.0 PRIMARY OSTEOARTHRITIS OF KNEES, BILATERAL: Primary | ICD-10-CM

## 2021-12-01 DIAGNOSIS — R45.851 SUICIDAL THOUGHTS: ICD-10-CM

## 2021-12-01 DIAGNOSIS — Z86.59 HISTORY OF DEPRESSION: ICD-10-CM

## 2021-12-01 DIAGNOSIS — F32.A DEPRESSION: Primary | ICD-10-CM

## 2021-12-01 DIAGNOSIS — G89.29 CHRONIC PAIN OF BOTH KNEES: ICD-10-CM

## 2021-12-01 DIAGNOSIS — M25.562 CHRONIC PAIN OF BOTH KNEES: ICD-10-CM

## 2021-12-01 DIAGNOSIS — M25.561 CHRONIC PAIN OF BOTH KNEES: ICD-10-CM

## 2021-12-01 PROCEDURE — 99284 EMERGENCY DEPT VISIT MOD MDM: CPT

## 2021-12-01 PROCEDURE — 99284 EMERGENCY DEPT VISIT MOD MDM: CPT | Performed by: PHYSICIAN ASSISTANT

## 2021-12-01 PROCEDURE — 99214 OFFICE O/P EST MOD 30 MIN: CPT | Performed by: ORTHOPAEDIC SURGERY

## 2021-12-01 PROCEDURE — 20610 DRAIN/INJ JOINT/BURSA W/O US: CPT | Performed by: ORTHOPAEDIC SURGERY

## 2021-12-01 PROCEDURE — 3008F BODY MASS INDEX DOCD: CPT | Performed by: ORTHOPAEDIC SURGERY

## 2021-12-01 RX ORDER — TRIAMCINOLONE ACETONIDE 40 MG/ML
80 INJECTION, SUSPENSION INTRA-ARTICULAR; INTRAMUSCULAR
Status: COMPLETED | OUTPATIENT
Start: 2021-12-01 | End: 2021-12-01

## 2021-12-01 RX ORDER — BUPIVACAINE HYDROCHLORIDE 5 MG/ML
6 INJECTION, SOLUTION EPIDURAL; INTRACAUDAL
Status: COMPLETED | OUTPATIENT
Start: 2021-12-01 | End: 2021-12-01

## 2021-12-01 RX ADMIN — TRIAMCINOLONE ACETONIDE 80 MG: 40 INJECTION, SUSPENSION INTRA-ARTICULAR; INTRAMUSCULAR at 10:31

## 2021-12-01 RX ADMIN — BUPIVACAINE HYDROCHLORIDE 6 ML: 5 INJECTION, SOLUTION EPIDURAL; INTRACAUDAL at 10:31
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
## (undated) DEVICE — SYRINGE 10ML LL CONTROL TOP

## (undated) DEVICE — MEDI-VAC YANK SUCT HNDL W/TPRD BULBOUS TIP: Brand: CARDINAL HEALTH

## (undated) DEVICE — PLUMEPEN PRO 10FT

## (undated) DEVICE — ASTOUND IMPERVIOUS SURGICAL GOWN: Brand: CONVERTORS

## (undated) DEVICE — TIBURON SPLIT SHEET: Brand: CONVERTORS

## (undated) DEVICE — SMALL NEEDLE COUNTER NEST

## (undated) DEVICE — SUT MONOCRYL 4-0 PS-2 27 IN Y426H

## (undated) DEVICE — BASIC DOUBLE BASIN 2-LF: Brand: MEDLINE INDUSTRIES, INC.

## (undated) DEVICE — RADIOLOGY STERILE LABELS: Brand: CENTURION

## (undated) DEVICE — TOWEL SET X-RAY

## (undated) DEVICE — TRAY EPIDURAL PERIFIX 20GA X 3.5IN TUOHY 8ML

## (undated) DEVICE — CHLORAPREP APPLICATOR TINTED 10.5ML ONE-STEP

## (undated) DEVICE — CHLORAPREP HI-LITE 26ML ORANGE

## (undated) DEVICE — SYRINGE 5ML LL

## (undated) DEVICE — PLASTIC ADHESIVE BANDAGE: Brand: CURITY

## (undated) DEVICE — BRACHIAL PLEXUS SET

## (undated) DEVICE — ANTIBACTERIAL UNDYED BRAIDED (POLYGLACTIN 910), SYNTHETIC ABSORBABLE SUTURE: Brand: COATED VICRYL

## (undated) DEVICE — TIBURON LAPAROTOMY DRAPE: Brand: CONVERTORS

## (undated) DEVICE — SUT SILK 0 FSL 18 IN 678G

## (undated) DEVICE — LIGACLIP MCA MULTIPLE CLIP APPLIERS, 20 MEDIUM CLIPS: Brand: LIGACLIP

## (undated) DEVICE — GLOVE SRG BIOGEL 7.5

## (undated) DEVICE — DRESSING MEPILEX AG BORDER 4 X 8 IN

## (undated) DEVICE — FLEXIBLE ADHESIVE BANDAGE,X-LARGE: Brand: CURITY

## (undated) DEVICE — DRAPE UTILITY

## (undated) DEVICE — INTENDED FOR TISSUE SEPARATION, AND OTHER PROCEDURES THAT REQUIRE A SHARP SURGICAL BLADE TO PUNCTURE OR CUT.: Brand: BARD-PARKER SAFETY BLADES SIZE 15, STERILE

## (undated) DEVICE — NEEDLE TUOHY 20G X 3.5 IN WINGED

## (undated) DEVICE — ADHESIVE SKIN HIGH VISCOSITY EXOFIN 1ML

## (undated) DEVICE — GLOVE SRG BIOGEL 8

## (undated) DEVICE — DRAPE SHEET THREE QUARTER

## (undated) DEVICE — NEEDLE BLUNT 18 G X 1 1/2 W FILTER

## (undated) DEVICE — SCD SEQUENTIAL COMPRESSION COMFORT SLEEVE MEDIUM KNEE LENGTH: Brand: KENDALL SCD

## (undated) DEVICE — GLOVE INDICATOR PI UNDERGLOVE SZ 7.5 BLUE

## (undated) DEVICE — PACK GENERAL LF

## (undated) DEVICE — NEEDLE 25G X 1 1/2

## (undated) DEVICE — LABEL STERILE (RSC) (2-SENSOR CAINE 2-LIDOCAINE 2-HEPARIN)